# Patient Record
Sex: FEMALE | Race: WHITE | NOT HISPANIC OR LATINO | Employment: FULL TIME | URBAN - METROPOLITAN AREA
[De-identification: names, ages, dates, MRNs, and addresses within clinical notes are randomized per-mention and may not be internally consistent; named-entity substitution may affect disease eponyms.]

---

## 2017-09-12 ENCOUNTER — OFFICE VISIT - RIVER FALLS (OUTPATIENT)
Dept: FAMILY MEDICINE | Facility: CLINIC | Age: 47
End: 2017-09-12

## 2017-12-06 ENCOUNTER — OFFICE VISIT - RIVER FALLS (OUTPATIENT)
Dept: FAMILY MEDICINE | Facility: CLINIC | Age: 47
End: 2017-12-06

## 2017-12-06 ASSESSMENT — MIFFLIN-ST. JEOR: SCORE: 1596.58

## 2017-12-07 LAB
CREAT SERPL-MCNC: 0.69 MG/DL (ref 0.5–1.1)
GLUCOSE BLD-MCNC: 92 MG/DL (ref 65–99)

## 2018-06-01 ENCOUNTER — OFFICE VISIT - RIVER FALLS (OUTPATIENT)
Dept: FAMILY MEDICINE | Facility: CLINIC | Age: 48
End: 2018-06-01

## 2018-06-01 ASSESSMENT — MIFFLIN-ST. JEOR: SCORE: 1495.88

## 2018-10-19 ENCOUNTER — OFFICE VISIT - RIVER FALLS (OUTPATIENT)
Dept: FAMILY MEDICINE | Facility: CLINIC | Age: 48
End: 2018-10-19

## 2018-10-19 ASSESSMENT — MIFFLIN-ST. JEOR: SCORE: 1531.26

## 2020-01-03 ENCOUNTER — OFFICE VISIT - RIVER FALLS (OUTPATIENT)
Dept: FAMILY MEDICINE | Facility: CLINIC | Age: 50
End: 2020-01-03

## 2020-01-03 ASSESSMENT — MIFFLIN-ST. JEOR: SCORE: 1756.25

## 2020-06-29 ENCOUNTER — OFFICE VISIT - RIVER FALLS (OUTPATIENT)
Dept: FAMILY MEDICINE | Facility: CLINIC | Age: 50
End: 2020-06-29

## 2021-10-19 ENCOUNTER — OFFICE VISIT - RIVER FALLS (OUTPATIENT)
Dept: FAMILY MEDICINE | Facility: CLINIC | Age: 51
End: 2021-10-19

## 2021-10-19 ENCOUNTER — COMMUNICATION - RIVER FALLS (OUTPATIENT)
Dept: FAMILY MEDICINE | Facility: CLINIC | Age: 51
End: 2021-10-19

## 2021-10-19 ENCOUNTER — AMBULATORY - RIVER FALLS (OUTPATIENT)
Dept: FAMILY MEDICINE | Facility: CLINIC | Age: 51
End: 2021-10-19

## 2021-10-19 ENCOUNTER — LAB REQUISITION (OUTPATIENT)
Dept: LAB | Facility: CLINIC | Age: 51
End: 2021-10-19
Payer: COMMERCIAL

## 2021-10-19 PROCEDURE — U0005 INFEC AGEN DETEC AMPLI PROBE: HCPCS | Mod: ORL | Performed by: FAMILY MEDICINE

## 2021-10-20 LAB — SARS-COV-2 RNA RESP QL NAA+PROBE: NEGATIVE

## 2021-10-21 LAB — SARS-COV-2 RNA RESP QL NAA+PROBE: NEGATIVE

## 2022-02-12 VITALS
HEIGHT: 63 IN | HEART RATE: 68 BPM | DIASTOLIC BLOOD PRESSURE: 78 MMHG | SYSTOLIC BLOOD PRESSURE: 120 MMHG | BODY MASS INDEX: 36.61 KG/M2 | TEMPERATURE: 98.4 F | WEIGHT: 206.6 LBS

## 2022-02-12 VITALS
WEIGHT: 198.8 LBS | BODY MASS INDEX: 35.22 KG/M2 | HEART RATE: 70 BPM | TEMPERATURE: 97.8 F | DIASTOLIC BLOOD PRESSURE: 74 MMHG | SYSTOLIC BLOOD PRESSURE: 116 MMHG | HEIGHT: 63 IN

## 2022-02-12 VITALS
SYSTOLIC BLOOD PRESSURE: 118 MMHG | TEMPERATURE: 97.5 F | BODY MASS INDEX: 39.16 KG/M2 | HEIGHT: 63 IN | WEIGHT: 221 LBS | DIASTOLIC BLOOD PRESSURE: 80 MMHG | HEART RATE: 60 BPM

## 2022-02-12 VITALS
BODY MASS INDEX: 45.39 KG/M2 | TEMPERATURE: 98.3 F | WEIGHT: 256.2 LBS | SYSTOLIC BLOOD PRESSURE: 110 MMHG | DIASTOLIC BLOOD PRESSURE: 78 MMHG | HEART RATE: 64 BPM | HEIGHT: 63 IN

## 2022-02-12 VITALS
WEIGHT: 231.6 LBS | BODY MASS INDEX: 41.03 KG/M2 | HEART RATE: 93 BPM | SYSTOLIC BLOOD PRESSURE: 93 MMHG | TEMPERATURE: 98.9 F | DIASTOLIC BLOOD PRESSURE: 67 MMHG

## 2022-02-12 VITALS — BODY MASS INDEX: 36.6 KG/M2 | HEIGHT: 63 IN

## 2022-02-16 NOTE — NURSING NOTE
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 escitalopram refill also.   Menstrual Status :   Menarcheal   Weight Measured :   256.2 lb(Converted to: 256 lb 3 oz, 116.21 kg)    Height Measured :   63 in(Converted to: 5 ft 3 in, 160.02 cm)    Body Mass Index :   45.38 kg/m2 (HI)    Body Surface Area :   2.27 m2   Systolic Blood Pressure :   110 mmHg   Diastolic Blood Pressure :   78 mmHg   Mean Arterial Pressure :   89 mmHg   Peripheral Pulse Rate :   64 bpm   BP Site :   Right arm   Pulse Site :   Radial artery   BP Method :   Manual   HR Method :   Manual   Temperature Tympanic :   98.3 DegF(Converted to: 36.8 DegC)    Vanessa Amanda - 1/3/2020 2:51 PM CST   Health Status   Allergies Verified? :   Yes   Medication History Verified? :   Yes   Medical History Verified? :   Yes   Pre-Visit Planning Status :   Completed   Tobacco Use? :   Never smoker   Vanessa Amanda - 1/3/2020 2:51 PM CST   Consents   Consent for Immunization Exchange :   Consent Granted   Consent for Immunizations to Providers :   Consent Granted   Vanessa Amanda - 1/3/2020 2:51 PM CST   Meds / Allergies   (As Of: 1/3/2020 2:58:26 PM CST)   Allergies (Active)   No known allergies  Estimated Onset Date:   Unspecified ; Created By:   Generated Domain User for [id number]; Reaction Status:   Active ; Substance:   No known allergies ; Updated By:   Generated Domain User for [id number]; Reviewed Date:   10/19/2018 12:34 PM CDT      No Known Medication Allergies  Estimated Onset Date:   Unspecified ; Created By:   Vanessa Amanda; Reaction Status:   Active ; Category:   Drug ; Substance:   No Known Medication Allergies ;  Type:   Allergy ; Updated By:   Vanessa Amanda; Reviewed Date:   1/3/2020 2:57 PM CST        Medication List   (As Of: 1/3/2020 2:58:26 PM CST)   Prescription/Discharge Order    escitalopram  :   escitalopram ; Status:   Prescribed ; Ordered As Mnemonic:   escitalopram 20 mg oral tablet ; Simple Display Line:   1 tab(s), Oral, daily, SEE PROVIDER FOR FURTHER REFILLS., 14 tab(s), 0 Refill(s) ; Ordering Provider:   Dorothea Holguin; Catalog Code:   escitalopram ; Order Dt/Tm:   5/21/2019 9:22:51 AM CDT          fluticasone nasal  :   fluticasone nasal ; Status:   Prescribed ; Ordered As Mnemonic:   Flonase 50 mcg/inh nasal spray ; Simple Display Line:   2 spray(s), Nasal, Daily, 1 EA, 6 Refill(s) ; Ordering Provider:   Marcelino Singh MD; Catalog Code:   fluticasone nasal ; Order Dt/Tm:   9/12/2017 7:17:21 PM CDT

## 2022-02-16 NOTE — PROGRESS NOTES
Patient:   YOMAIRA HO            MRN: [id number]            FIN: [id number]               Age:   50 years     Sex:  Female     :  1970   Associated Diagnoses:   CARIDAD (generalized anxiety disorder); Obesity   Author:   Dorothea Holguin      Visit Information      Date of Service: 2020 07:44 am  Performing Location: [facility]  Encounter#: [id number]      Primary Care Provider (PCP):  YUNIOR BARBA    NPI# [id number]      Referring Provider:  Dorothea Holguin    NPI# [id number]   Visit type:  video.    Participants in room during visit:  pt_   Location of patient:  _home  Location of provider:  _ clinic  Video Start Time:  _8:36  Video End Time:   _8:48    Today's visit was conducted via video conference due to the COVID-19 pandemic.  The patient's consent to proceed with a video visit has been obtained and documented.      Chief Complaint   2020 8:30 AM CDT    depression/anxiety med check verbal consent given for video visit        History of Present Illness   Patient is a 50_ year old female_ who is being evaluated via a billable video visit.    feels like she has gained #60 since before Lexapro.  Forgot her pills and missed 10 days of pills and noticed she has a sex drive again. Doesn't want to be on 'nothing' because it helps with her temper and feel more stable. Uses it for Anxiety, asked about wellbutrin. Had used keto for wt loss then gained it all back      Health Status   Allergies:    Allergic Reactions (Selected)  Severity Not Documented  No known allergies (No reactions were documented)  No Known Medication Allergies   Medications:  (Selected)   Prescriptions  Prescribed  escitalopram 20 mg oral tablet: = 1 tab(s), Oral, daily, # 90 tab(s), 3 Refill(s), Type: Maintenance, Pharmacy: [facility] #[id number], 1 tab(s) Oral daily,    Medications          *denotes recorded medication          escitalopram 20 mg oral tablet: 1 tab(s), Oral, daily, 90 tab(s), 3  Refill(s).       Problem list:    All Problems  Family history of colon cancer in mother / SNOMED CT 511762748 / Confirmed  Age 65  CARIDAD (generalized anxiety disorder) / SNOMED CT 20785537 / Confirmed  Obesity / SNOMED CT 5143331964 / Probable  Persistent insomnia disorder with non-sleep disorder mental comorbidity / SNOMED CT 316143377 / Confirmed      Histories   Past Medical History:    No active or resolved past medical history items have been selected or recorded.   Family History:    No family history items have been selected or recorded.   Procedure history:    Colonoscopy (SNOMED CT 742892654) performed by Arvin Alvarado MD on 12/20/2018 at 48 Years.  Comments:  1/21/2020 11:23 AM CST - Lilibeth Forrest  Indication:  First-degree relative with colon polyps at age 60.  Sedation:  MAC.  Impression:  Diverticulosis in entire colon.   Recommendation:  Repeat in 5 years.  Tonsillectomy (SNOMED CT 540686320).  Cholecystectomy (SNOMED CT 86447762).  Breast reconstruction (SNOMED CT 37277432).   Social History:        Alcohol Assessment            Current, Wine (5 oz), 1-2 times per month, 3 drinks/episode average.  4 drinks/episode maximum.      Tobacco Assessment            Never (less than 100 in lifetime)      Employment and Education Assessment            Employed      Home and Environment Assessment            Marital status:  (Living together).        Physical Examination   believes she weight about #250 which is what she weight about at her last visit   General:  Alert and oriented, No acute distress.    Eye:  Normal conjunctiva.    Respiratory:  Respirations are non-labored.    Psychiatric:  Cooperative, Appropriate mood & affect, Normal judgment.       Impression and Plan   Diagnosis     CARIDAD (generalized anxiety disorder) (ZDX34-CV F41.1).     Obesity (QZF68-MO E66.9).     Plan:  counseled on wt loss plan, reduce carbs, maintain 1400 moe diet initially  will restart Lexapro at 10 mg daily, do not  advise switch to Wellbutrin for strictly anxiety at this time  she is in agreement, check wiht me in 3months.    Orders     Orders (Selected)   Prescriptions  Prescribed  Lexapro 10 mg oral tablet: = 1 tab(s) ( 10 mg ), Oral, daily, # 90 tab(s), 0 Refill(s), Type: Maintenance, Pharmacy: [facility] #[id number], reduced dose, 1 tab(s) Oral daily, 63, in, 06/29/20 8:30:00 CDT, Height Measured, 256.2, lb, 01/03/20 14:51:00 CST, Weight Measured.

## 2022-02-16 NOTE — PROGRESS NOTES
Patient:   YOMAIRA HO            MRN: [id number]            FIN: [id number]               Age:   47 years     Sex:  Female     :  1970   Associated Diagnoses:   Leg cramps   Author:   Dorothea Holguin      Visit Information      Date of Service: 2017 04:20 pm  Performing Location: [facility]  Encounter#: [id number]      Primary Care Provider (PCP):  YUNIOR BARBA      Referring Provider:  Richard Huddleston MD    NPI# [id number]      Chief Complaint   2017 4:29 PM CST    c/o recent anxiety and states she had it in the past. Also complains of shin pain x2 months, usually when shes driving-- she is wondering if it is related to being on a ketogenic-low carb diet?      History of Present Illness   confirmed chief complaint with patient  The cramps occur in the evening and night but primarily while driving. She has had to pull over and let her daughter drive because the cramps were so bad.  The pain is not in the calf but in the low anterior leg.  She is not otherwise ill in anyway.  No OTC products have been used  She is wondering about magnesium levels.           Review of Systems             Health Status   Allergies:    Allergic Reactions (Selected)  Severity Not Documented  No known allergies (No reactions were documented)   Medications:  (Selected)   Prescriptions  Prescribed  Flonase 50 mcg/inh nasal spray: 2 spray(s), Nasal, Daily, # 1 EA, 6 Refill(s), Type: Maintenance, Pharmacy: [id number], 2 spray(s) nasal daily  Lexapro 10 mg oral tablet: 1 tab(s) ( 10 mg ), PO, Daily, # 90 tab(s), 0 Refill(s), Type: Maintenance, Pharmacy: [id number], 1 tab(s) po daily   Problem list:    All Problems  Obesity / SNOMED CT 9074870726 / Probable  Resolved: Cholecystectomy  Resolved: Tonsillectomy  Resolved: Breast reconstruction      Histories   Past Medical History:    Resolved  Cholecystectomy:  Resolved.  Tonsillectomy:  Resolved.  Breast  reconstruction:  Resolved.   Family History:    No family history items have been selected or recorded.   Procedure history:    No active procedure history items have been selected or recorded.   Social History:             No active social history items have been recorded.      Physical Examination   Vital Signs   12/6/2017 4:29 PM CST Temperature Tympanic 97.5 DegF  LOW    Peripheral Pulse Rate 60 bpm    Pulse Site Radial artery    HR Method Manual    Systolic Blood Pressure 118 mmHg    Diastolic Blood Pressure 80 mmHg    Mean Arterial Pressure 93 mmHg    BP Site Right arm    BP Method Manual      Measurements from flowsheet : Measurements   12/6/2017 4:29 PM CST Height Measured - Standard 63 in    Weight Measured - Standard 221.0 lb    BSA 2.11 m2    Body Mass Index 39.14 kg/m2      General:  Alert and oriented, No acute distress.    Neck:  Supple.    Respiratory:  Lungs are clear to auscultation, Respirations are non-labored.    Cardiovascular:  Normal peripheral perfusion, no ankle edema,  no calf tenderness.       Impression and Plan   Diagnosis     Leg cramps (IER08-DV R25.2).     Patient Instructions:       Counseled: Patient, Regarding diagnosis, Regarding treatment, Regarding medications, Verbalized understanding, Counseled on symptomatic management. Return to clinic for re evaluation if worsening, simply not improving, or failure to resolve.   will trial magnesium sultfate 250mg daily, she has been using magnesium oxide 500mg daily and was told she might have the wrong type of magnesium by a friend and will make a switch.    Orders     Orders (Selected)   Outpatient Orders  Completed  Basic Metabolic Panel* ([facility]): Specimen Type: Serum, Collection Date: 12/06/17 17:01:00 CST.

## 2022-02-16 NOTE — PROGRESS NOTES
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 escitalopram refill also.        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 She took excess naproxen and ibuprofen about 12 hours ago, is using ice. has some shooting pain into her buttock on the left side but no numbness in foot  no hx of prior back injury requiring any surgery or treatment  she has gained wt and has not been exercising      Physical Examination   Vital Signs   1/3/2020 2:51 PM CST Temperature Tympanic 98.3 DegF    Peripheral Pulse Rate 64 bpm    Pulse Site Radial artery    HR Method Manual    Systolic Blood Pressure 110 mmHg    Diastolic Blood Pressure 78 mmHg    Mean Arterial Pressure 89 mmHg    BP Site Right arm    BP Method Manual      General:  Mild distress, difficulty standing  can lumbar flex 30 degrees, twist side to side is very painful  was unable to lie supine for straight leg lift, no bruising on back.       Impression and Plan   Diagnosis     Lumbar strain (NXF85-KO S39.012A).     Plan:  ice/rest /prednisone /muscle relaxor   tolerated toradol 60mg im well, monitored  gradual return to work  advised PT and  wt loss to prevent recurrent problem.    Orders     Orders (Selected)   Outpatient Orders  Ordered  Referral (Request): 01/03/20 15:18:00 CST, Referred to: Other, Referred to: set up PT for back pain, Lumbar strain  Prescriptions  Prescribed  cyclobenzaprine 10 mg oral tablet: = 1 tab(s) ( 10 mg ), Oral, tid, PRN: for spasm, # 30 tab(s), 0 Refill(s), Type: Maintenance, Pharmacy: [facility] #[id number], 1 tab(s) Oral tid,PRN:for spasm  predniSONE 10 mg oral tablet: = 3 tab(s) ( 30 mg ), Oral, daily, x 7 day(s), # 21 tab(s), 0 Refill(s), Type: Acute, Pharmacy: [facility] #[id number], 3 tab(s) Oral daily,x7 day(s).

## 2022-02-16 NOTE — TELEPHONE ENCOUNTER
Entered by Lizzie Aldridge CMA on April 15, 2019 11:06:00 AM CDT  PCP:   [facility]    Medication:   Escitalopram   Last Filled:  10/19/18   Quantity: 90 tab  Refills:  1    Date of last office visit and reason:   10/19/18 Multiple  Date of last labs pertaining to condition:  n/a    Note:  Please advise on refill. No RTC placed.     Return to Clinic order placed?  n/a    Resource:   eRx pool  Phone:   [phone]      ------------------------------------------  From: JEDI MIND [id number]  To: Dorothea Holguin  Sent: April 13, 2019 6:18:33 PM CDT  Subject: Medication Management  Due: April 14, 2019 6:18:33 PM CDT    ** On Hold Pending Signature **  Drug: escitalopram (escitalopram 20 mg oral tablet)  TAKE 1 TABLET BY MOUTH DAILY  Quantity: 90 tab(s)     Days Supply: 0         Refills: 0  Substitutions Allowed  Notes from Pharmacy:     Dispensed Drug: escitalopram (escitalopram 20 mg oral tablet)  TAKE 1 TABLET BY MOUTH DAILY  Quantity: 90 tab(s)     Days Supply: 90        Refills: 0  Substitutions Allowed  Notes from Pharmacy:   ---------------------------------------------------------------  From: Lizzie Aldridge CMA (eRx Pool ([address]))   To: [facility] Pool ([address]);     Sent: 4/15/2019 11:06:09 AM CDT  Subject: FW: Medication Management   Due Date/Time: 4/14/2019 6:18:00 PM CDT---------------------  From: Nena Hill LPN ([facility] Pool ([address]_[facility]))   To: Dorothea Holguin;     Sent: 4/15/2019 7:58:31 PM CDT  Subject: FW: Medication Management   Due Date/Time: 4/14/2019 6:18:00 PM CDT---------------------  From: Dorothea Holguin   To: [facility] Pool ([address]);     Sent: 4/15/2019 8:17:44 PM CDT  Subject: RE: Medication Management     please send 30 day refill and ask her to see me.---------------------  From: Vanessa Amanda   To: [facility] [id number]    Sent: 4/16/2019 8:19:07 AM CDT  Subject: RE: Medication Management     ** Submitted:  **  Order:escitalopram (escitalopram 20 mg oral tablet)  1 tab(s)  Oral  daily  see provider for further refills  Qty:  30 tab(s)        Refills:  0          Substitutions Allowed     Route To [facility] [id number]    Signed by Vanessa Amanda  4/16/2019 8:18:00 AM    ** Submitted: **  Complete:escitalopram (escitalopram 20 mg oral tablet)   Signed by Vanessa Amanda  4/16/2019 8:19:00 AM    ** Not Approved:  **  escitalopram (ESCITALOPRAM 20MG TABLETS)  TAKE 1 TABLET BY MOUTH DAILY  Qty:  90 tab(s)        Days Supply:  90        Refills:  0          Substitutions Allowed     Route To [facility] [id number]   Signed by Vanessa Amanda

## 2022-02-16 NOTE — PROGRESS NOTES
Patient:   YOMAIRA HO            MRN: [id number]            FIN: [id number]               Age:   48 years     Sex:  Female     :  1970   Associated Diagnoses:   CARIDAD (generalized anxiety disorder); Persistent insomnia disorder with non-sleep disorder mental comorbidity; Family history of colon cancer in mother   Author:   Yuri Pimentel MD      Visit Information   Visit type:  Scheduled follow-up.    Accompanied by:  No one.    Source of history:  Self.    History limitation:  None.       Chief Complaint   2018 4:32 PM CDT     F/u anxiety medication      History of Present Illness    The patient has generalized anxiety disorder here for refill of Lexapro.  She was on Lexapro for a number of years during her stressful first marriage and it worked well.  When she and her two girls lived along and she was working at home she found that her stress was better and she was off medication for an extended time.  She is now remarried and between she and her second  they have five teenage daughters at home and it is very stressful.  She is working at running a .  She has been on Lexapro for about six months at 10 mg daily and it has been helpful.  Her GAD7 score is at 14.  PHQ-9 score is 8.  She has chronic insomnia and has difficulty going to sleep and staying asleep.  Her  snores and moves and talks in his sleep sometimes.  We discussed sleep hygiene.  She does not snore, no restless legs or leg movements.  She does not fall asleep during the daytime.  We discussed cognitive and behavioral therapy for insomnia.     In reviewing her chart her mother had colon cancer at age 65 so colonoscopy for the patient is indicated starting at age 40.  Ultimately we decided she would follow up her provider, Dorothea Ellsworth RN, C-NP, in the near future.           Review of Systems   Constitutional:  No weakness, No fatigue, No decreased activity, No weight loss.    Respiratory:  No shortness of breath.     Cardiovascular:  No chest pain.    Gastrointestinal:  No nausea, No vomiting, No diarrhea.    Neurologic:  Negative.    Psychiatric:  Negative except as documented in history of present illness.       Health Status   Allergies:    Allergic Reactions (Selected)  Severity Not Documented  No known allergies (No reactions were documented)   Medications:  (Selected)   Prescriptions  Prescribed  Flonase 50 mcg/inh nasal spray: 2 spray(s), Nasal, Daily, # 1 EA, 6 Refill(s), Type: Maintenance, Pharmacy: [facility], 2 spray(s) nasal daily  escitalopram 20 mg oral tablet: 1 tab(s) ( 20 mg ), PO, Daily, # 30 tab(s), 1 Refill(s), Type: Maintenance, Pharmacy: [facility] [address], 1 tab(s) po daily   Problem list:    All Problems (Selected)  Obesity / SNOMED CT 4970880892 / Probable      Histories   Family History:    No family history items have been selected or recorded.   Procedure history:    Tonsillectomy (928364540).  Cholecystectomy (41237151).  Breast reconstruction (07176177).   Social History:        Alcohol Assessment            Current, Wine (5 oz), 1-2 times per month, 3 drinks/episode average.      Tobacco Assessment            Never (less than 100 in lifetime)      Employment and Education Assessment            Employed      Home and Environment Assessment            Marital status:  (Living together).        Physical Examination   Vital Signs   6/1/2018 4:32 PM CDT Temperature Tympanic 97.8 DegF  LOW    Peripheral Pulse Rate 70 bpm    Pulse Site Radial artery    HR Method Manual    Systolic Blood Pressure 116 mmHg    Diastolic Blood Pressure 74 mmHg    Mean Arterial Pressure 88 mmHg    BP Site Right arm    BP Method Manual      Measurements from flowsheet : Measurements   6/1/2018 4:32 PM CDT Height Measured - Standard 63 in    Weight Measured - Standard 198.8 lb    BSA 2 m2    Body Mass Index 35.21 kg/m2  HI      General:  Alert and oriented, No acute distress.    Eye:  Normal  conjunctiva.    HENT:  Normocephalic, Normal hearing.    Musculoskeletal:  Normal gait.    Integumentary:  No pallor.    Neurologic:  No focal deficits.    Psychiatric:  Cooperative, Appropriate mood & affect, Normal judgment, Non-suicidal.       Impression and Plan   Diagnosis     CARIDAD (generalized anxiety disorder) (MVO50-LY F41.1).     Persistent insomnia disorder with non-sleep disorder mental comorbidity (CDK52-FR G47.00).     Course:  Incomplete response to treatment.    Patient Instructions:       Counseled: Regarding medications, Diet, Activity, Verbalized understanding, sleep hygiene.    Orders     Orders (Selected)   Outpatient Orders  Ordered  Return to Clinic (Request): Return in 6-8 weeks  Prescriptions  Prescribed  escitalopram 20 mg oral tablet: 1 tab(s) ( 20 mg ), PO, Daily, # 30 tab(s), 1 Refill(s), Type: Maintenance, Pharmacy: [facility] [address], 1 tab(s) po daily.     Diagnosis     Family history of colon cancer in mother (PFO07-NH Z80.0).     Orders     Colonoscopy recommended.

## 2022-02-16 NOTE — PROGRESS NOTES
Patient:   YOMAIRA HO            MRN: [id number]            FIN: [id number]               Age:   48 years     Sex:  Female     :  1970   Associated Diagnoses:   Family history of colon cancer in mother; CARIDAD (generalized anxiety disorder); Varicose vein of leg   Author:   Dorothea Holguin      Chief Complaint   10/19/2018 12:30 PM CDT  Depression/anxiety med check/refill, also right shoulder pain since , denies any injury        History of Present Illness   confirmed chief complaint with patient  Here to catch up on a few things  1. needs order for screening colonoscopy. Mom onset age 65 of colon cancer  2.  would like to update all preventative care--will schedule mammo, tdap and flu shot today  3.  here for Lexapro recheck. going well at 20mg dose  4. woke up with knot behind right should this morning, thinks she slep on it funny, good ROM of arm  5. she has painful varicose veins on both legs,  has been this way for years, requests to see someone to have them fixed         Review of Systems             Health Status   Allergies:    Allergic Reactions (Selected)  Severity Not Documented  No known allergies (No reactions were documented)   Medications:  (Selected)   Prescriptions  Prescribed  Flonase 50 mcg/inh nasal spray: 2 spray(s), Nasal, Daily, # 1 EA, 6 Refill(s), Type: Maintenance, Pharmacy: [id number], 2 spray(s) nasal daily  escitalopram 20 mg oral tablet: See Instructions, Instructions: TAKE 1 TABLET BY MOUTH DAILY, # 90 tab(s), 1 Refill(s), Type: Soft Stop, Pharmacy: [facility] [id number], TAKE 1 TABLET BY MOUTH DAILY   Problem list:    All Problems  Obesity / SNOMED CT 1210581978 / Probable  CARIDAD (generalized anxiety disorder) / SNOMED CT 62069257 / Confirmed  Family history of colon cancer in mother / SNOMED CT 533623877 / Confirmed  Age 65  Persistent insomnia disorder with non-sleep disorder mental comorbidity / SNOMED CT 351191832 / Confirmed  Canceled:  Cholecystectomy  Canceled: Tonsillectomy  Canceled: Breast reconstruction      Histories   Past Medical History:    No active or resolved past medical history items have been selected or recorded.   Family History:    No family history items have been selected or recorded.   Procedure history:    Tonsillectomy (SNOMED CT 919406525).  Cholecystectomy (SNOMED CT 18851437).  Breast reconstruction (SNOMED CT 05988102).   Social History:        Alcohol Assessment            Current, Wine (5 oz), 1-2 times per month, 3 drinks/episode average.  4 drinks/episode maximum.      Tobacco Assessment            Never (less than 100 in lifetime)      Employment and Education Assessment            Employed      Home and Environment Assessment            Marital status:  (Living together).        Physical Examination   Vital Signs   10/19/2018 12:30 PM CDT Temperature Tympanic 98.4 DegF    Peripheral Pulse Rate 68 bpm    Pulse Site Radial artery    HR Method Manual    Systolic Blood Pressure 120 mmHg    Diastolic Blood Pressure 78 mmHg    Mean Arterial Pressure 92 mmHg    BP Site Right arm    BP Method Manual      Measurements from flowsheet : Measurements   10/19/2018 12:30 PM CDT Height Measured - Standard 63 in    Weight Measured - Standard 206.6 lb    BSA 2.04 m2    Body Mass Index 36.59 kg/m2  HI      General:  Alert and oriented, No acute distress.    Neck:  Supple, Non-tender, No lymphadenopathy.    Respiratory:  Lungs are clear to auscultation, Respirations are non-labored, Breath sounds are equal.    Cardiovascular:  Normal rate, Regular rhythm, No murmur, has some spider veins at right and left knee and describes rope like veins under her jeans higher on the thighs.    Musculoskeletal:  Normal range of motion, Normal gait.    Integumentary:  Warm, Dry, Pink.       Impression and Plan   Diagnosis     Family history of colon cancer in mother (KTX01-CP Z80.0).     CARIDAD (generalized anxiety disorder) (HVZ83-HB F41.1).      Varicose vein of leg (GFT35-TD I83.90).     Patient Instructions:       Counseled: Patient, Regarding diagnosis, Regarding treatment, Regarding medications, Verbalized understanding, will set up with DR Hernandez for veins  refilled lexapro  vaccine update  orders written for colonoscopy given FH of colon cancer  will rtc for labs, she is following a ketogenic diet and wants to know impact on lipids/A1c  schedule annual exam with pap as she is due.    Orders     Orders (Selected)   Outpatient Orders  Ordered  Referral (Request): 10/19/18 13:01:00 CDT, Referred to: Cardiology, Referred to: Dr Tracie Hernandez, Varicose vein of leg  Prescriptions  Prescribed  escitalopram 20 mg oral tablet: See Instructions, Instructions: TAKE 1 TABLET BY MOUTH DAILY, # 90 tab(s), 1 Refill(s), Type: Soft Stop, Pharmacy: [facility] [id number], TAKE 1 TABLET BY MOUTH DAILY.

## 2022-02-16 NOTE — TELEPHONE ENCOUNTER
---------------------  From: Bryanna Green CMA   Sent: 10/19/2021 4:10:24 PM CDT  [facility]     [facility] was seen at [facility] today for covid testing per Dr. Singh. 02 Sat not taken. Pt resides in [facility]-will notify Public Health if positive.

## 2022-02-16 NOTE — NURSING NOTE
Comprehensive Intake Entered On:  6/29/2020 8:35 AM CDT    Performed On:  6/29/2020 8:30 AM CDT by Danielle Guzman CMA               Summary   Chief Complaint :   depression/anxiety med check verbal consent given for video visit    Menstrual Status :   Menarcheal   Height Measured :   63 in(Converted to: 5 ft 3 in, 160.02 cm)    Thomas NEWMAN Danielle - 6/29/2020 8:30 AM CDT   Health Status   Allergies Verified? :   Yes   Medication History Verified? :   Yes   Medical History Verified? :   No   Pre-Visit Planning Status :   Not completed   Tobacco Use? :   Never smoker   Thomas NEWMAN Danielle - 6/29/2020 8:30 AM CDT   Consents   Consent for Immunization Exchange :   Consent Granted   Consent for Immunizations to Providers :   Consent Granted   Danielle Guzman CMA - 6/29/2020 8:30 AM CDT   Meds / Allergies   (As Of: 6/29/2020 8:35:12 AM CDT)   Allergies (Active)   No known allergies  Estimated Onset Date:   Unspecified ; Created By:   Generated Domain User for [id number]; Reaction Status:   Active ; Substance:   No known allergies ; Updated By:   MIKA Audio Domain User for [id number]; Reviewed Date:   6/29/2020 8:34 AM CDT      No Known Medication Allergies  Estimated Onset Date:   Unspecified ; Created By:   Vanessa Amanda; Reaction Status:   Active ; Category:   Drug ; Substance:   No Known Medication Allergies ; Type:   Allergy ; Updated By:   Vanessa Amanda; Reviewed Date:   6/29/2020 8:34 AM CDT        Medication List   (As Of: 6/29/2020 8:35:12 AM CDT)   Prescription/Discharge Order    cyclobenzaprine  :   cyclobenzaprine ; Status:   Processing ; Ordered As Mnemonic:   cyclobenzaprine 10 mg oral tablet ; Ordering Provider:   Dorothea Holguin; Action Display:   Complete ; Catalog Code:   cyclobenzaprine ; Order Dt/Tm:   6/29/2020 8:33:26 AM CDT          escitalopram  :   escitalopram ; Status:   Prescribed ; Ordered As Mnemonic:   escitalopram 20 mg oral tablet ; Simple Display Line:   1 tab(s), Oral, daily, 90 tab(s), 3  Refill(s) ; Ordering Provider:   Dorothea Holguin; Catalog Code:   escitalopram ; Order Dt/Tm:   1/3/2020 3:19:50 PM CST          fluticasone nasal  :   fluticasone nasal ; Status:   Processing ; Ordered As Mnemonic:   Flonase 50 mcg/inh nasal spray ; Ordering Provider:   Marcelino Singh MD; Action Display:   Complete ; Catalog Code:   fluticasone nasal ; Order Dt/Tm:   6/29/2020 8:33:41 AM CDT            ID Risk Screen   Recent Travel History :   No recent travel   Family Member Travel History :   No recent travel   Other Exposure to Infectious Disease :   Unknown   Danielle Guzman CMA - 6/29/2020 8:30 AM CDT

## 2022-02-16 NOTE — TELEPHONE ENCOUNTER
---------------------  From: Angus JACINTO, Wendy HURT   To: Mónica Salgado;     Sent: 10/20/2021 5:32:59 PM CDT  Subject: Negative COVID     Pt informed by phone of negative COVID swab from 10/19/21. She will call back tomorrow with phone number to fax result to for her work.Pt called back and is requesting a note for work be printed and left at the  for her to  this afternoon.  Will print and leave with  staff.

## 2022-02-16 NOTE — NURSING NOTE
Depression Screening Entered On:  1/6/2020 8:17 AM CST    Performed On:  1/3/2020 8:16 AM CST by Ayleen Dunn               Depression Screening   Little Interest - Pleasure in Activities :   Not at all   Feeling Down, Depressed, Hopeless :   Not at all   Initial Depression Screen Score :   0    Trouble Falling or Staying Asleep :   More than half the days   Feeling Tired or Little Energy :   Several days   Poor Appetite or Overeating :   Several days   Feeling Bad About Yourself :   Not at all   Trouble Concentrating :   Not at all   Moving or Speaking Slowly :   Not at all   Thoughts Better Off Dead or Hurting Self :   Not at all   Detailed Depression Screen Score :   4    Total Depression Screen Score :   4    CARIDAD Difficulty with Work, Home, Others :   Somewhat difficult   Ayleen Dunn - 1/6/2020 8:16 AM CST

## 2022-02-16 NOTE — TELEPHONE ENCOUNTER
"---------------------  From: Ethel Fontaine CMA ([email or web address] Message Pool ([id number]_[facility]))   To: Marcelino Singh MD;     Sent: 10/19/2021 1:27:47 PM CDT  Subject: testing FYSPARKLE     Pt informed scheduling that she plans to be tested at her \"home\" clinic since they have 24 hour turn around time.  "

## 2022-02-16 NOTE — TELEPHONE ENCOUNTER
---------------------  From: Angus JACINTO, Wendy HURT (Phone Messages Pool ([id number]_[facility]))   To: Marcelino Singh MD;     Sent: 10/20/2021 5:40:09 PM CDT  Subject: Teeth hurt, swollen upper pallet     Phone message    PCP:   Saw [facility]      Time of Call:  1735       Person Calling:  Pt  Phone number:  [phone]    Returned call at: _    Note:   Pt has negative COVID swab from 10/19/21  States her teeth have been hurting today and like the roof of her mouth is swollen. She would like an Abx to treat it thinking it is a sinus infections.    Spoke to [facility]. Will send in Rx for Amoxicillin 875 mg po bid x10 days to [facility] pharmacy.---------------------  From: Marcelino Singh MD   To: Phone Messages [employer] ([id number]_[place] [facility] [place]);     Sent: 10/20/2021 5:40:32 PM CDT  Subject: RE: Teeth hurt, swollen upper pallet     okPt was informed of Rx. That it may take a few days to feel better. To come in if not better after done with Rx. She expressed understanding.

## 2022-02-16 NOTE — NURSING NOTE
Generalized Anxiety Disorder Screening Entered On:  1/6/2020 8:17 AM CST    Performed On:  1/3/2020 8:16 AM CST by Ayleen Dunn               Generalized Anxiety Disorder Screening   CARIDAD Nervous, Anxious On Edge :   More than half the days   CARIDAD Control Worrying B :   More than half the days   CARIDAD Worrying Too Much :   More than half the days   CARIDAD Restless :   Several days   CARIDAD Easily Annoyed/Irritable :   Nearly every day   CARIDAD Afraid :   More than half the days   CARIDAD Trouble Relaxing :   Several days   CARIDAD Total Screening Score :   13    CARIDAD Difficulty with Work, Home, Others :   Very difficult   Ayleen Dunn - 1/6/2020 8:16 AM CST

## 2022-02-16 NOTE — NURSING NOTE
Comprehensive Intake Entered On:  10/19/2021 11:32 AM CDT    Performed On:  10/19/2021 11:26 AM CDT by Ethel Fontaine CMA               Summary   Chief Complaint :   Verbal consent given for video visit. Cough, ST, HA, chills x 6 days. Positive COVID last year - sense of smell still has not come back.    Menstrual Status :   Menarcheal   Ethel Fontaine CMA - 10/19/2021 11:26 AM CDT   Health Status   Allergies Verified? :   Yes   Medication History Verified? :   Yes   Pre-Visit Planning Status :   Not completed   Ethel Fontaine CMA - 10/19/2021 11:26 AM CDT   Meds / Allergies   (As Of: 10/19/2021 11:32:04 AM CDT)   Allergies (Active)   No known allergies  Estimated Onset Date:   Unspecified ; Created By:   Generated Domain User for [id number]; Reaction Status:   Active ; Substance:   No known allergies ; Updated By:   Generated Domain User for [id number]; Reviewed Date:   6/29/2020 8:34 AM CDT      No Known Medication Allergies  Estimated Onset Date:   Unspecified ; Created By:   Vanessa Amanda; Reaction Status:   Active ; Category:   Drug ; Substance:   No Known Medication Allergies ; Type:   Allergy ; Updated By:   Vanessa Amanda; Reviewed Date:   6/29/2020 8:34 AM CDT        Medication List   (As Of: 10/19/2021 11:32:04 AM CDT)   Prescription/Discharge Order    escitalopram  :   escitalopram ; Status:   Prescribed ; Ordered As Mnemonic:   Lexapro 10 mg oral tablet ; Simple Display Line:   10 mg, 1 tab(s), Oral, daily, 90 tab(s), 0 Refill(s) ; Ordering Provider:   Dorothea Holguin; Catalog Code:   escitalopram ; Order Dt/Tm:   6/29/2020 8:52:11 AM CDT            Social History   Social History   (As Of: 10/19/2021 11:32:04 AM CDT)   Alcohol:        Current, Wine (5 oz), 1-2 times per month, 3 drinks/episode average.  4 drinks/episode maximum.   (Last Updated: 6/5/2018 7:37:56 AM CDT by Saida Odom)          Tobacco:        Never (less than 100 in lifetime)   (Last Updated: 6/1/2018 4:53:44 PM CDT by Margarito HERNANDEZ,  Yuri)   Never (less than 100 in lifetime)   (Last Updated: 10/19/2021 11:30:31 AM CDT by Ethel Fontaine CMA)          Electronic Cigarette/Vaping:        Electronic Cigarette Use: Never.   (Last Updated: 10/19/2021 11:30:38 AM CDT by Ethel Fontaine CMA)          Employment/School:        Employed   (Last Updated: 6/1/2018 4:53:44 PM CDT by Yuri Pimentel MD)          Home/Environment:        Marital status:  (Living together).   (Last Updated: 6/1/2018 4:53:44 PM CDT by Yuri Pimentel MD)

## 2022-02-16 NOTE — PROGRESS NOTES
Patient:   YOMAIRA HO            MRN: [id number]            FIN: [id number]               Age:   51 years     Sex:  Female     :  1970   Associated Diagnoses:   Acute viral syndrome   Author:   Marcelino Singh MD      Visit Information      Date of Service: 10/19/2021 06:37 am  Performing Location: [facility]  Encounter#: [id number]      Primary Care Provider (PCP):  YUNIOR BARBA    NPI# [id number]      Referring Provider:  Marcelino Singh MD    NPI# [id number]   Visit type:  Video Visit via [facility] or [employer].    Participants in room during visit:  _ pt   Location of patient:  _ home  Location of provider:  _ (Clinic office   Video Start Time:  _ 1135  Video End Time:   _1140    Today's visit was conducted via video conference due to the COVID-19 pandemic.  The patient's consent to proceed with a video visit has been obtained and documented.      Chief Complaint   10/19/2021 11:26 AM CDT  Verbal consent given for video visit. Cough, ST, HA, chills x 6 days. Positive COVID last year - sense of smell still has not come back.        History of Present Illness   Patient is being evaluated via a billable video visit. Patient calls for video visit because of cold.  She notes that 6daysshe started having nasal congestion and cough.  No fevers chills or sweats.  She does going out of  as frequently.  She had Covid a year ago and lost her sense of taste and smell since.  No chest pain no shortness of breath no rashes         Review of Systems   Constitutional:  Negative.    Eye:  Negative.    Ear/Nose/Mouth/Throat:  Negative except as documented in history of present illness.    Respiratory:  Negative except as documented in history of present illness.    Cardiovascular:  Negative.    Gastrointestinal:  Negative.    Genitourinary:  Negative.    Immunologic:  Negative.    Musculoskeletal:  Negative.    Integumentary:  Negative.    Neurologic:  Negative.    Psychiatric:   Negative.       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   Family History:    No family history items have been selected or recorded.   Procedure history:    Colonoscopy (SNOMED CT 767018348) performed by Jake Alvarado MDret on 12/20/2018 at 48 Years.  Comments:  1/21/2020 11:23 AM CST - Lilibeth Forrest  Indication:  First-degree relative with colon polyps at age 60.  Sedation:  MAC.  Impression:  Diverticulosis in entire colon.   Recommendation:  Repeat in 5 years.  Tonsillectomy (SNOMED CT 735146880).  Cholecystectomy (SNOMED CT 45772463).  Breast reconstruction (SNOMED CT 07919795).   Social History:        Electronic Cigarette/Vaping Assessment            Electronic Cigarette Use: Never.      Alcohol Assessment            Current, Wine (5 oz), 1-2 times per month, 3 drinks/episode average.  4 drinks/episode maximum.      Tobacco Assessment            Never (less than 100 in lifetime)            Never (less than 100 in lifetime)      Employment and Education Assessment            Employed      Home and Environment  Assessment            Marital status:  (Living together).        Physical Examination   General:  Alert and oriented, No acute distress.    Eye:  Pupils are equal, round and reactive to light, Normal conjunctiva.    HENT:  Oral mucosa is moist.    Neck:  Supple.    Respiratory:  Respirations are non-labored.    Psychiatric:  Cooperative, Appropriate mood & affect, Normal judgment.       Impression and Plan   Diagnosis     Acute viral syndrome (MVO18-AV B34.9).     Plan:  Patient with acute viral syndrome will check her for Covid.  Follow-up in a week if not better sooner if worse start December use it a year into no taste and smell now lightheadedness  .

## 2022-02-16 NOTE — PROGRESS NOTES
Patient:   YOMAIRA HO            MRN: [id number]            FIN: [id number]               Age:   47 years     Sex:  Female     :  1970   Associated Diagnoses:   Cough; Boil   Author:   Marcelino Singh MD      Visit Information      Date of Service: 2017 06:40 pm  Performing Location: [facility]  Encounter#: [id number]      Primary Care Provider (PCP):  YUNIOR BARBA      Referring Provider:  Marcelino Singh MD    NPI# [id number]      Chief Complaint   2017 7:05 PM CDT    Cough, sinus pressure.  Headache.  x3-4 weeks. Lump in underarm area, left side.        History of Present Illness   chief complaint and symptoms as noted above confirmed with patient   Patient has been sick for the past 3-4 weeks with congestion and cough she is bringing up some scant yellow sputum at times.  No fevers chills or sweats.  A daughter has mono.  She has had a lot of congestion has a remote history of seasonal allergies with asthma.  In addition to this she gets recurrent boils under her arms for the last year         Review of Systems   Constitutional:  Negative except as documented in history of present illness.    Ear/Nose/Mouth/Throat:  Negative except as documented in history of present illness.    Respiratory:  Negative except as documented in history of present illness.    Cardiovascular:  Negative.    Musculoskeletal:  Negative.    Integumentary:  Negative except as documented in history of present illness.    Neurologic:  Negative.             Health Status   Allergies:    Allergic Reactions (Selected)  Severity Not Documented  No known allergies (No reactions were documented)   Medications:  (Selected)   Prescriptions  Prescribed  Azithromycin 5 Day Dose Pack 250 mg oral tablet: 2 tabs today then 1 a day for 4 days, PO, qAM, x 5 day(s), Instructions: as directed on package labeling, # 6 tab(s), 0 Refill(s), Type: Acute, Pharmacy: [facility] [id number], 2 tabs today then 1 a  day for 4 days po qam,x5 day(s),Instr:as direct...  Flonase 50 mcg/inh nasal spray: 2 spray(s), Nasal, Daily, # 1 EA, 6 Refill(s), Type: Maintenance, Pharmacy: [facility] [id number], 2 spray(s) nasal daily  Septra  mg-160 mg oral tablet: 1 tab(s), PO, BID, # 20 tab(s), 0 Refill(s), Type: Maintenance, Pharmacy: [id number], 1 tab(s) po bid,x10 day(s)   Problem list:    All Problems (Selected)  Obesity / SNOMED CT 2537054018 / Probable      Histories   Past Medical History:    Resolved  Cholecystectomy:  Resolved.  Tonsillectomy:  Resolved.  Breast reconstruction:  Resolved.   Family History:    No family history items have been selected or recorded.   Procedure history:    No active procedure history items have been selected or recorded.   Social History:             No active social history items have been recorded.      Physical Examination   Vital Signs   9/12/2017 7:05 PM CDT Temperature Tympanic 98.9 DegF    Peripheral Pulse Rate 93 bpm    HR Method Electronic    Systolic Blood Pressure 93 mmHg    Diastolic Blood Pressure 67 mmHg    Mean Arterial Pressure 76 mmHg    BP Method Electronic      Measurements from flowsheet : Measurements   9/12/2017 7:05 PM CDT    Weight Measured - Standard                231.6 lb     General:  Alert and oriented, No acute distress.    HENT:  Tympanic membranes are clear, No pharyngeal erythema.    Neck:  Supple, Non-tender, No lymphadenopathy.    Respiratory:  Lungs are clear to auscultation, Respirations are non-labored.    Cardiovascular:  Normal rate, Regular rhythm.    Integumentary:  He has a resolving cyst under her right axilla  .    Neurologic:  Alert.       Impression and Plan   Diagnosis     Cough (UMO78-WT R05).     Boil (PZW79-UN L02.92).     Course:  Not progressing as expected.    Plan:  Productive cough for month with congestion will treat with Zithromax if she is not better consider chest x-ray and trial of inhalers.  Discussed skin care  with Hibiclens and a trial of Bactrim for 10 days.  For her boil.  .    Patient Instructions:       Counseled: Patient, Regarding diagnosis, Regarding treatment, Regarding medications.

## 2022-02-16 NOTE — LETTER
(Inserted Image. Unable to display)       May 17, 2019      YOMAIAR HO  [id number], [place] [id number]          Dear YOMAIRA,      Thank you for selecting [facility] for your healthcare needs.     Our records indicate you are due for the following services:     Medication Check    To schedule an appointment or if you have further questions, please contact your primary clinic:   [facility]       [phone]   [facility]       [phone]              [facility]     [phone]    Powered by [employer]    Sincerely,    VIRI Barajas

## 2022-02-16 NOTE — TELEPHONE ENCOUNTER
Entered by Elizabeth Amin RN on May 21, 2019 9:23:07 AM CDT  ---------------------  From: Elizabeth Amin RN   To: [email or web address] [id number]    Sent: 5/21/2019 9:23:07 AM CDT  Subject: Medication Management     ** Submitted: **  Order:escitalopram (escitalopram 20 mg oral tablet)  1 tab(s)  Oral  daily  SEE PROVIDER FOR FURTHER REFILLS.  Qty:  14 tab(s)        Refills:  0          Substitutions Allowed     Route To [facility] [email or web address] [facility]    Signed by Elizabeth Amin RN  5/21/2019 9:22:00 AM    ** Submitted: **  Complete:escitalopram (escitalopram 20 mg oral tablet)   Signed by Elizabeth Amin RN  5/21/2019 9:23:00 AM    ** Not Approved:  **  escitalopram (ESCITALOPRAM 20MG TABLETS)  TAKE 1 TABLET BY MOUTH DAILY SEE PROVIDER FOR FURTHER REFILLS  Qty:  30 tab(s)        Days Supply:  30        Refills:  0          Substitutions Allowed     Route To [facility] [employer] [address]   Signed by Elizabeth Amin RN            ------------------------------------------  From: [email or web address] [id number]  To: Dorothea Holguin  Sent: May 17, 2019 12:22:59 PM CDT  Subject: Medication Management  Due: May 18, 2019 12:22:59 PM CDT    ** On Hold Pending Signature **  Drug: escitalopram (escitalopram 20 mg oral tablet)  1 TAB(S) ORAL DAILY,INSTR:SEE PROVIDER FOR FURTHER REFILLS  Quantity: 30 tab(s)     Days Supply: 0         Refills: 0  Substitutions Allowed  Notes from Pharmacy:     Dispensed Drug: escitalopram (escitalopram 20 mg oral tablet)  TAKE 1 TABLET BY MOUTH DAILY SEE PROVIDER FOR FURTHER REFILLS  Quantity: 30 tab(s)     Days Supply: 30        Refills: 0  Substitutions Allowed  Notes from Pharmacy:   ------------------------------------------Date of last office visit and reason:  10-19-18 Multiple w/NCB      Date of last Med Check / Px:   10-19-18  Date of last labs pertaining to med:  _    RTC order in chart:  yes, due now for med check w/NCB    For Protocol refill, has patient been contacted:  reminder letter sent  5-17-19 for med check. Left message for patient informing her that two week supply was sent and appointment would need to be made for further refills.

## 2023-05-19 ENCOUNTER — OFFICE VISIT (OUTPATIENT)
Dept: FAMILY MEDICINE | Facility: CLINIC | Age: 53
End: 2023-05-19
Payer: COMMERCIAL

## 2023-05-19 VITALS
WEIGHT: 282 LBS | TEMPERATURE: 97.9 F | OXYGEN SATURATION: 96 % | HEART RATE: 100 BPM | BODY MASS INDEX: 49.95 KG/M2 | DIASTOLIC BLOOD PRESSURE: 84 MMHG | RESPIRATION RATE: 20 BRPM | SYSTOLIC BLOOD PRESSURE: 134 MMHG

## 2023-05-19 DIAGNOSIS — F41.9 ANXIETY AND DEPRESSION: ICD-10-CM

## 2023-05-19 DIAGNOSIS — L72.3 SEBACEOUS CYST: Primary | ICD-10-CM

## 2023-05-19 DIAGNOSIS — F32.A ANXIETY AND DEPRESSION: ICD-10-CM

## 2023-05-19 PROBLEM — G47.00 INSOMNIA: Status: ACTIVE | Noted: 2023-05-19

## 2023-05-19 PROBLEM — F41.1 GENERALIZED ANXIETY DISORDER: Status: ACTIVE | Noted: 2023-05-19

## 2023-05-19 PROBLEM — E66.9 OBESITY: Status: ACTIVE | Noted: 2023-05-19

## 2023-05-19 PROCEDURE — 99213 OFFICE O/P EST LOW 20 MIN: CPT | Performed by: FAMILY MEDICINE

## 2023-05-19 PROCEDURE — 96127 BRIEF EMOTIONAL/BEHAV ASSMT: CPT | Performed by: FAMILY MEDICINE

## 2023-05-19 RX ORDER — SULFAMETHOXAZOLE/TRIMETHOPRIM 800-160 MG
1 TABLET ORAL 2 TIMES DAILY
Qty: 20 TABLET | Refills: 0 | Status: SHIPPED | OUTPATIENT
Start: 2023-05-19

## 2023-05-19 RX ORDER — ESCITALOPRAM OXALATE 10 MG/1
10 TABLET ORAL DAILY
Qty: 30 TABLET | Refills: 1 | Status: SHIPPED | OUTPATIENT
Start: 2023-05-19 | End: 2023-08-10

## 2023-05-19 ASSESSMENT — PATIENT HEALTH QUESTIONNAIRE - PHQ9
5. POOR APPETITE OR OVEREATING: MORE THAN HALF THE DAYS
SUM OF ALL RESPONSES TO PHQ QUESTIONS 1-9: 6

## 2023-05-19 ASSESSMENT — ANXIETY QUESTIONNAIRES
IF YOU CHECKED OFF ANY PROBLEMS ON THIS QUESTIONNAIRE, HOW DIFFICULT HAVE THESE PROBLEMS MADE IT FOR YOU TO DO YOUR WORK, TAKE CARE OF THINGS AT HOME, OR GET ALONG WITH OTHER PEOPLE: VERY DIFFICULT
GAD7 TOTAL SCORE: 12
2. NOT BEING ABLE TO STOP OR CONTROL WORRYING: NEARLY EVERY DAY
6. BECOMING EASILY ANNOYED OR IRRITABLE: MORE THAN HALF THE DAYS
3. WORRYING TOO MUCH ABOUT DIFFERENT THINGS: SEVERAL DAYS
5. BEING SO RESTLESS THAT IT IS HARD TO SIT STILL: SEVERAL DAYS
1. FEELING NERVOUS, ANXIOUS, OR ON EDGE: SEVERAL DAYS
7. FEELING AFRAID AS IF SOMETHING AWFUL MIGHT HAPPEN: MORE THAN HALF THE DAYS
GAD7 TOTAL SCORE: 12

## 2023-05-19 ASSESSMENT — ENCOUNTER SYMPTOMS: WOUND: 1

## 2023-05-19 NOTE — PROGRESS NOTES
Assessment & Plan     Sebaceous cyst  Not able to open yet  - sulfamethoxazole-trimethoprim (BACTRIM DS) 800-160 MG tablet; Take 1 tablet by mouth 2 times daily    Anxiety and depression  Returned   - escitalopram (LEXAPRO) 10 MG tablet; Take 1 tablet (10 mg) by mouth daily      F/U Tuesday     MD NOAM Scott [facility] [place]    Lou Mascorro is a 53 year old, presenting for the following health issues:  Derm Problem (Abnormal sore on the back. Was red, raised and popped. Now returning. Hot and hard)        Roomed by: SRud              History of Present Illness       Reason for visit:  Bug bite? seems infected  Symptom onset:  3-4 weeks ago  Symptoms include:  Sore on my back that may be infected  Symptom intensity:  Moderate  Symptom progression:  Staying the same  Had these symptoms before:  Yes  Has tried/received treatment for these symptoms:  No  What makes it worse:  No  What makes it better:  No    She eats 2-3 servings of fruits and vegetables daily.She consumes 0 sweetened beverage(s) daily.She exercises with enough effort to increase her heart rate 9 or less minutes per day.  She exercises with enough effort to increase her heart rate 3 or less days per week.   She is taking medications regularly.       Anxiety Follow-Up    How are you doing with your anxiety since your last visit? Worsened     Are you having other symptoms that might be associated with anxiety? Yes:  see [facility]    Have you had a significant life event? Grief or Loss     Are you feeling depressed? Yes:   of roomates    Do you have any concerns with your use of alcohol or other drugs? No    Social History     Tobacco Use     Smoking status: Never     Smokeless tobacco: Never   Vaping Use     Vaping status: Never Used         2023     2:30 PM   CARIDAD-7 SCORE   Total Score 12         2023     2:30 PM   PHQ   PHQ-9 Total Score 6   Q9: Thoughts of better off dead/self-harm past 2 weeks Not  at all         5/19/2023     2:30 PM   Last PHQ-9   1.  Little interest or pleasure in doing things 0   2.  Feeling down, depressed, or hopeless 0   3.  Trouble falling or staying asleep, or sleeping too much 3   4.  Feeling tired or having little energy 1   5.  Poor appetite or overeating 1   6.  Feeling bad about yourself 1   7.  Trouble concentrating 0   8.  Moving slowly or restless 0   Q9: Thoughts of better off dead/self-harm past 2 weeks 0   PHQ-9 Total Score 6   Difficulty at work, home, or with people Somewhat difficult         5/19/2023     2:30 PM   CARIDAD-7    1. Feeling nervous, anxious, or on edge 1   2. Not being able to stop or control worrying 3   3. Worrying too much about different things 1   4. Trouble relaxing 2   5. Being so restless that it is hard to sit still 1   6. Becoming easily annoyed or irritable 2   7. Feeling afraid, as if something awful might happen 2   CARIDAD-7 Total Score 12   If you checked any problems, how difficult have they made it for you to do your work, take care of things at home, or get along with other people? Very difficult       Skin Lesion  Onset/Duration: 2 weeks  Description  Location: back  Color: red  Border description: sharp border  Character: round  Itching: no  Bleeding:  No  Intensity:  moderate  Progression of Symptoms:  worsening  Accompanying signs and symptoms:   Bleeding: No  Scaling: No  Excessive sun exposure/tanning: No  Sunscreen used: No        Review of Systems   Skin: Positive for wound.      Constitutional, HEENT, cardiovascular, pulmonary, gi and gu systems are negative, except as otherwise noted.      Objective    /84 (BP Location: Right arm, Patient Position: Sitting)   Pulse 100   Temp 97.9  F (36.6  C) (Tympanic)   Resp 20   Wt 127.9 kg (282 lb)   LMP  (LMP Unknown)   SpO2 96%   BMI 49.95 kg/m    Body mass index is 49.95 kg/m .  Physical Exam   GENERAL: healthy, alert and no distress  NECK: no adenopathy, no asymmetry, masses, or  scars and thyroid normal to palpation  RESP: lungs clear to auscultation - no rales, rhonchi or wheezes  CV: regular rate and rhythm, normal S1 S2, no S3 or S4, no murmur, click or rub, no peripheral edema and peripheral pulses strong  ABDOMEN: soft, nontender, no hepatosplenomegaly, no masses and bowel sounds normal  SKIN: Sebaceous Cyst to back, not lancable  PSYCH: mentation appears normal, affect normal/bright

## 2023-05-23 ENCOUNTER — OFFICE VISIT (OUTPATIENT)
Dept: FAMILY MEDICINE | Facility: CLINIC | Age: 53
End: 2023-05-23
Payer: COMMERCIAL

## 2023-05-23 VITALS
RESPIRATION RATE: 16 BRPM | DIASTOLIC BLOOD PRESSURE: 67 MMHG | TEMPERATURE: 98.6 F | SYSTOLIC BLOOD PRESSURE: 117 MMHG | HEART RATE: 94 BPM

## 2023-05-23 DIAGNOSIS — L72.3 SEBACEOUS CYST: Primary | ICD-10-CM

## 2023-05-23 PROCEDURE — 10060 I&D ABSCESS SIMPLE/SINGLE: CPT | Performed by: FAMILY MEDICINE

## 2023-05-23 NOTE — PROGRESS NOTES
Assessment & Plan     Sebaceous cyst  - SKIN: Incision and drainage                 Beck Yen MD  [facility]    Lou Mascorro is a 53 year old, presenting for the following health issues:  Cyst (Back)        5/23/2023     2:31 PM   Additional Questions   Roomed by SRud   Accompanied by n/a     HPI     F/U Cyst on back, drained over weekend.        Review of Systems   Otherwise negative      Objective    /67 (BP Location: Left arm, Patient Position: Sitting)   Pulse 94   Temp 98.6  F (37  C) (Tympanic)   Resp 16   LMP  (LMP Unknown)   There is no height or weight on file to calculate BMI.  Physical Exam   [unfilled]    SKIN: Incision and drainage    Date/Time: 5/23/2023 3:03 PM    Performed by: Beck Yen MD  Authorized by: Beck Yen MD      UNIVERSAL PROTOCOL   Site Marked: Yes  Prior Images Obtained and Reviewed:  Yes  Required items: Required blood products, implants, devices and special equipment available    Patient identity confirmed:  Verbally with patient  Patient was reevaluated immediately before administering moderate or deep sedation or anesthesia  Confirmation Checklist:  Patient's identity using two indicators, relevant allergies, procedure was appropriate and matched the consent or emergent situation and correct equipment/implants were available  Time out: Immediately prior to the procedure a time out was called    Universal Protocol: the Joint Commission Universal Protocol was followed    Preparation: Patient was prepped and draped in usual sterile fashion      ANESTHESIA  Local infiltration  Local anesthesia used: yes  Local Anesthetic: lidocaine 1% without epinephrine  Anesthetic total: 3 mL    SEDATION    Patient Sedated: No      Type: cyst  Body area: trunk  Scalpel size: 11  Incision type: single straight  Incision depth: dermal  Complexity: simple  Drainage: serosanguinous and  bloody  Drainage amount:  moderate  Wound treatment: wound left open and  drain placed  Packing material: 1/2 in gauze      PROCEDURE    Patient Tolerance:  Patient tolerated the procedure well with no immediate complications and patient tolerated the procedure well with no immediate complications  Length of time physician/provider present for 1:1 monitoring during sedation: 0   After informed consent, timeout was called.  Patient was prepped and draped in the usual fashion.  Wound was anesthetized with 3 cc of 1% lidocaine without epinephrine.  A 2 cm incision was made over the cyst.  Wound was explored with a hemostat.  Large amount of drainage was removed.  Cavity was packed with half-inch plain gauze.  Large Band-Aid dressing was applied.  Patient tolerated procedure well.

## 2023-06-04 ENCOUNTER — HEALTH MAINTENANCE LETTER (OUTPATIENT)
Age: 53
End: 2023-06-04

## 2023-08-10 DIAGNOSIS — F41.9 ANXIETY AND DEPRESSION: ICD-10-CM

## 2023-08-10 DIAGNOSIS — F32.A ANXIETY AND DEPRESSION: ICD-10-CM

## 2023-08-10 RX ORDER — ESCITALOPRAM OXALATE 10 MG/1
10 TABLET ORAL DAILY
Qty: 90 TABLET | Refills: 1 | Status: SHIPPED | OUTPATIENT
Start: 2023-08-10 | End: 2023-11-29

## 2023-08-10 NOTE — TELEPHONE ENCOUNTER
"Routing refill request to provider for review/approval because:  PHQ9    PHQ-9 score:        5/19/2023     2:30 PM   PHQ   PHQ-9 Total Score 6   Q9: Thoughts of better off dead/self-harm past 2 weeks Not at all     Last Written Prescription Date:  5/19/23  Last Fill Quantity: 30,  # refills: 1   Last office visit provider:   5/23/23 with cynthia    Requested Prescriptions   Pending Prescriptions Disp Refills    escitalopram (LEXAPRO) 10 MG tablet [Pharmacy Med Name: ESCITALOPRAM 10MG TABLETS] 30 tablet 1     Sig: TAKE 1 TABLET(10 MG) BY MOUTH DAILY       SSRIs Protocol Failed - 8/10/2023 12:16 PM        Failed - PHQ-9 score less than 5 in past 6 months     Please review last PHQ-9 score.           Passed - Medication is active on med list        Passed - Patient is age 18 or older        Passed - No active pregnancy on record        Passed - No positive pregnancy test in last 12 months        Passed - Recent (6 mo) or future (30 days) visit within the authorizing provider's specialty     Patient had office visit in the last 6 months or has a visit in the next 30 days with authorizing provider or within the authorizing provider's specialty.  See \"Patient Info\" tab in inbasket, or \"Choose Columns\" in Meds & Orders section of the refill encounter.                 ELIEZER FERGUSON RN 08/10/23 12:16 PM  "

## 2023-11-29 DIAGNOSIS — F32.A ANXIETY AND DEPRESSION: ICD-10-CM

## 2023-11-29 DIAGNOSIS — F41.9 ANXIETY AND DEPRESSION: ICD-10-CM

## 2023-11-29 RX ORDER — ESCITALOPRAM OXALATE 10 MG/1
10 TABLET ORAL DAILY
Qty: 90 TABLET | Refills: 1 | Status: SHIPPED | OUTPATIENT
Start: 2023-11-29 | End: 2024-07-26

## 2024-07-21 ENCOUNTER — HEALTH MAINTENANCE LETTER (OUTPATIENT)
Age: 54
End: 2024-07-21

## 2024-07-26 DIAGNOSIS — F41.9 ANXIETY AND DEPRESSION: ICD-10-CM

## 2024-07-26 DIAGNOSIS — F32.A ANXIETY AND DEPRESSION: ICD-10-CM

## 2024-07-26 RX ORDER — ESCITALOPRAM OXALATE 10 MG/1
10 TABLET ORAL DAILY
Qty: 90 TABLET | Refills: 0 | Status: SHIPPED | OUTPATIENT
Start: 2024-07-26

## 2024-07-26 NOTE — TELEPHONE ENCOUNTER
Overdue for physical, please have her schedule with me or Dr. Yen, she is due for Pap this year.  I sent another 3 months of her medication but would like to her to get on my schedule before she runs out

## 2025-01-17 ENCOUNTER — ANCILLARY PROCEDURE (OUTPATIENT)
Dept: GENERAL RADIOLOGY | Facility: CLINIC | Age: 55
End: 2025-01-17
Attending: NURSE PRACTITIONER
Payer: COMMERCIAL

## 2025-01-17 DIAGNOSIS — E28.2 PCOS (POLYCYSTIC OVARIAN SYNDROME): ICD-10-CM

## 2025-01-17 DIAGNOSIS — G89.29 CHRONIC PAIN OF LEFT KNEE: ICD-10-CM

## 2025-01-17 DIAGNOSIS — M25.562 CHRONIC PAIN OF LEFT KNEE: ICD-10-CM

## 2025-01-17 DIAGNOSIS — R06.2 WHEEZING: ICD-10-CM

## 2025-01-17 PROBLEM — F41.9 ANXIETY AND DEPRESSION: Status: ACTIVE | Noted: 2025-01-17

## 2025-01-17 PROBLEM — F32.A ANXIETY AND DEPRESSION: Status: ACTIVE | Noted: 2025-01-17

## 2025-01-17 PROCEDURE — 73562 X-RAY EXAM OF KNEE 3: CPT | Mod: TC | Performed by: RADIOLOGY

## 2025-01-20 ENCOUNTER — PATIENT OUTREACH (OUTPATIENT)
Dept: CARE COORDINATION | Facility: CLINIC | Age: 55
End: 2025-01-20
Payer: COMMERCIAL

## 2025-01-20 RX ORDER — METFORMIN HYDROCHLORIDE 500 MG/1
TABLET, EXTENDED RELEASE ORAL
Qty: 225 TABLET | Refills: 1 | Status: SHIPPED | OUTPATIENT
Start: 2025-01-20

## 2025-01-20 RX ORDER — ALBUTEROL SULFATE 90 UG/1
2 INHALANT RESPIRATORY (INHALATION) EVERY 6 HOURS PRN
Qty: 18 G | Refills: 0 | Status: SHIPPED | OUTPATIENT
Start: 2025-01-20

## 2025-01-20 NOTE — TELEPHONE ENCOUNTER
RN called to speak with patient.  Patient has not started medication.  RX written on 1/17/25.    RN sending original RX, as [facility] is having technical issues.

## 2025-01-22 ENCOUNTER — OFFICE VISIT (OUTPATIENT)
Dept: EDUCATION SERVICES | Facility: CLINIC | Age: 55
End: 2025-01-22
Payer: COMMERCIAL

## 2025-01-22 ENCOUNTER — TELEPHONE (OUTPATIENT)
Dept: EDUCATION SERVICES | Facility: CLINIC | Age: 55
End: 2025-01-22

## 2025-01-22 VITALS — BODY MASS INDEX: 50.04 KG/M2 | HEIGHT: 63 IN | WEIGHT: 282.41 LBS

## 2025-01-22 DIAGNOSIS — E66.9 OBESITY: Primary | ICD-10-CM

## 2025-01-22 DIAGNOSIS — E88.819 INSULIN RESISTANCE: ICD-10-CM

## 2025-01-22 PROCEDURE — 97802 MEDICAL NUTRITION INDIV IN: CPT | Performed by: DIETITIAN, REGISTERED

## 2025-01-22 PROCEDURE — 99207 PR DROP WITH A PROCEDURE: CPT | Performed by: DIETITIAN, REGISTERED

## 2025-01-22 SDOH — HEALTH STABILITY: PHYSICAL HEALTH: ON AVERAGE, HOW MANY DAYS PER WEEK DO YOU ENGAGE IN MODERATE TO STRENUOUS EXERCISE (LIKE A BRISK WALK)?: 0 DAYS

## 2025-01-22 ASSESSMENT — ANXIETY QUESTIONNAIRES
3. WORRYING TOO MUCH ABOUT DIFFERENT THINGS: NEARLY EVERY DAY
1. FEELING NERVOUS, ANXIOUS, OR ON EDGE: MORE THAN HALF THE DAYS
6. BECOMING EASILY ANNOYED OR IRRITABLE: MORE THAN HALF THE DAYS
8. IF YOU CHECKED OFF ANY PROBLEMS, HOW DIFFICULT HAVE THESE MADE IT FOR YOU TO DO YOUR WORK, TAKE CARE OF THINGS AT HOME, OR GET ALONG WITH OTHER PEOPLE?: SOMEWHAT DIFFICULT
GAD7 TOTAL SCORE: 13
5. BEING SO RESTLESS THAT IT IS HARD TO SIT STILL: SEVERAL DAYS
GAD7 TOTAL SCORE: 13
7. FEELING AFRAID AS IF SOMETHING AWFUL MIGHT HAPPEN: NOT AT ALL
7. FEELING AFRAID AS IF SOMETHING AWFUL MIGHT HAPPEN: NOT AT ALL
GAD7 TOTAL SCORE: 13
4. TROUBLE RELAXING: MORE THAN HALF THE DAYS
2. NOT BEING ABLE TO STOP OR CONTROL WORRYING: NEARLY EVERY DAY
IF YOU CHECKED OFF ANY PROBLEMS ON THIS QUESTIONNAIRE, HOW DIFFICULT HAVE THESE PROBLEMS MADE IT FOR YOU TO DO YOUR WORK, TAKE CARE OF THINGS AT HOME, OR GET ALONG WITH OTHER PEOPLE: SOMEWHAT DIFFICULT

## 2025-01-22 ASSESSMENT — SOCIAL DETERMINANTS OF HEALTH (SDOH): HOW OFTEN DO YOU GET TOGETHER WITH FRIENDS OR RELATIVES?: TWICE A WEEK

## 2025-01-22 ASSESSMENT — PATIENT HEALTH QUESTIONNAIRE - PHQ9
SUM OF ALL RESPONSES TO PHQ QUESTIONS 1-9: 8
SUM OF ALL RESPONSES TO PHQ QUESTIONS 1-9: 8
10. IF YOU CHECKED OFF ANY PROBLEMS, HOW DIFFICULT HAVE THESE PROBLEMS MADE IT FOR YOU TO DO YOUR WORK, TAKE CARE OF THINGS AT HOME, OR GET ALONG WITH OTHER PEOPLE: SOMEWHAT DIFFICULT

## 2025-01-22 NOTE — PATIENT INSTRUCTIONS
1270 - 1500 calories  Zepbound: proper use, mechanism of action, indications, dosing, injection schedule, safety and side effects.  Pt is shown a demonstration of the use of the pen and was able to return demonstration without difficulty.   Dose Escalation Schedule    Start zepbound 2.5 mg once weekly for 4 weeks, then increase to 5 mg once weekly for 4 weeks.  If tolerating zepbound well, can increase to 7.5 mg per week after that.      Zepbound Dosing:   Start Zepbound: It is a subcutaneous injection that you inject once weekly and titrate the dose slowly over time. Make sure inject the same time each day of the week, for example Monday evenings.  Each pen is single use.  In each prescription, you will get 4 pens in each box.  You will need a new prescription for each strength of the medication.  Week 1-4: Inject 2.5 mg once weekly  Week 5-8: If tolerating, can increase to 5 mg once weekly if necessary  Week 9-12: If tolerating, can increase to 7.5 mg once weekly if necessary  Week 13-16: If tolerating, can increase to 10 mg once weekly if necessary  Week 17-20: If tolerating, can increase to 12.5 mg once weekly if necessary  Week 21 and on: if tolerating, can increase to 15 mg once weekly if necessary     The goal is to get to a dose that is well tolerated and effective for you. You do not have to go up on the dose each month or get to maximum dose if getting an effective response with minimal side effects.      *If you are having some nausea or other side effects to where you are hesitant to move up to the next dose, stay at the same dose you are on for an additional week to see if side effect(s) improves/resolves. Make sure to take this time to hydrate and ensure you are drinking at least 64 oz water per day. If you are wanting to stay at the same dose and do not have additional refills on that prescription please reach out to the clinic.     Zepbound Administration Video: Video that was created by the  .  https://www.zepbound.Netsizecom/how-to-use     Zepbound Copay Card:  https://www.zepbound.Played.com/coverage-savings     Zepbound Storage and Stability:   Make sure that when you get the prescription that you store the prescription in the refrigerator until it is time to use the Zepbound pen.  Once it is time to use the Zepbound pen, you can keep the pen at room temperature and it is good for up to 21 days at room temperature.      Zepbound Common Side Effects:   Nausea, diarrhea, constipation, headache, tiredness (fatigue), dizziness, stomach upset/pain. Less commonly, Zepbound can cause low blood sugar (symptoms: shaky, dizzy, sweaty, agitation). Please reach out to the care team should you feel like this is occurring. It is important to ensure that you are eating consistent meals and not skipping meals. Ensure you are getting at least 64 oz water daily.         Goals:  Practice healthy stress management (mindful eating, think are you physically hungry or are you board, stressed, emotional etc.) make of list of things to do besides eat.    Try to get good quality sleep with a goal of 7-8 hours per night.  Stay physically active on a daily basis and throughout the year.  Recommend a fitness tracker; gradually increase the average to a minimum of 6000 steps with the ultimate goal of 10,000 steps per day.      Eat in a healthy way; follow the plate method.  Keep a food record ([employer]; [employer]it).  Nutrition reference:  Eat 3 meals a day; snacks are optional.    A meal is 3 or more food groups; make it colorful for better nutrition.          .

## 2025-01-22 NOTE — LETTER
1/22/2025         RE: Subha Howell  [id number] [facility] [address]        Dear Colleague,    Thank you for referring your patient, Subha Howell, to the [facility]. Please see a copy of my visit note below.        Medical Nutrition Therapy  Visit Type:Initial assessment and intervention    Subha Howell presents today for MNT and education related to weight management for class 3 obesity Body mass index is 50.83 kg/m .  She is accompanied by self.     ASSESSMENT:   Patient comments/concerns relating to nutrition: Patient has been on diet without successful long-term results.  Patient states that the keto meal plan worked best and was able to achieve weight of 180 pounds, patient also has been trying intermittent fasting.  Patient notes after keto diet stopped weight regain plus 20 pounds.  Patient is interested in weight loss counseling and weight loss medication.  Brought into patient's medication benefits and Zepbound will be approved after prior authorization completed.  Zepbound also has been indicated for treatment of sleep apnea and patient may be having a sleep study in the future.  Over the last 7 years since 6/2018 ~ 83# weight gain.  Weight stable since 5/2023.    Wt Readings from Last 30 Encounters:   01/22/25 128.1 kg (282 lb 6.6 oz)   01/22/25 128.1 kg (282 lb 8 oz)   01/17/25 127.4 kg (280 lb 15 oz)   05/19/23 127.9 kg (282 lb)   01/03/20 116.2 kg (256 lb 3.2 oz)   10/19/18 93.7 kg (206 lb 9.6 oz)   06/01/18 90.2 kg (198 lb 12.8 oz)   12/06/17 100.2 kg (221 lb)   09/12/17 105.1 kg (231 lb 9.6 oz)   06/10/13 96.2 kg (212 lb)     NUTRITION HISTORY:  Patient reports meals being primarily all home-cooked does not have fast food often at all.  Patient's hunger fullness cues are not very predominant.  Patient can go a long time without eating without feeling hungry but then may overeat at the next meal.  Breakfast: Does not typically have breakfast  Lunch: liam  "board type foods (meat cheese pickles olives), fruit  Dinner: Protein and vegetables typically occasionally a starch but not often  Snacks: Typically meat and cheese   Beverages: water, a few diet sodas per week, bubbly water, coffee with oat creamer     Misses meals? Breakfast   Eats out:  1- 2 meals/per month     Previous diet education:  No     Food allergies/intolerances: potential gluten intolerance     Diet is high in: calories  Diet is low in: fat (saturated) and fiber    EXERCISE: minimal exercise due to knee and back pain    SOCIO/ECONOMIC:   Lives with: spouse and children    MEDICATIONS:  Current Outpatient Medications   Medication Sig Dispense Refill     metFORMIN (GLUCOPHAGE XR) 500 MG 24 hr tablet TAKE 1 TABLET DAILY WITH A MEAL X 1 WEEK, 1 TABLET TWICE DAILY WITH MEALS X 1 WEEK, 1 IN AM& 2 IN PM X 1 WEEK, THEN 2 TWICE DAILY 225 tablet 1     albuterol (PROAIR HFA/PROVENTIL HFA/VENTOLIN HFA) 108 (90 Base) MCG/ACT inhaler Inhale 2 puffs into the lungs every 6 hours as needed for shortness of breath, wheezing or cough. 18 g 0     escitalopram (LEXAPRO) 10 MG tablet Take 1 tablet (10 mg) by mouth daily. 90 tablet 0     Misc Natural Products (GLUCOSAMINE CHOND MSM FORMULA PO) Take by mouth daily.       No current facility-administered medications for this visit.       LABS:  No results found for: \"NA\"   No results found for: \"POTASSIUM\"  No results found for: \"CHLORIDE\"  No results found for: \"RAJIV\"  No results found for: \"CO2\"  No results found for: \"BUN\"  Lab Results   Component Value Date    CR 0.69 12/06/2017     Lab Results   Component Value Date    GLC 92 12/06/2017     No results found for: \"LDL\"  No results found for: \"HDL\"]  No results found for: \"GFRESTIMATED\"  Lab Results   Component Value Date    CR 0.69 12/06/2017     No results found for: \"MICROALBUMIN\"    ANTHROPOMETRICS:  Vitals: Ht 1.588 m (5' 2.5\")   Wt 128.1 kg (282 lb 6.6 oz)   LMP  (LMP Unknown)   BMI 50.83 kg/m    Body mass index is " 50.83 kg/m .      Wt Readings from Last 5 Encounters:   01/22/25 128.1 kg (282 lb 6.6 oz)   01/22/25 128.1 kg (282 lb 8 oz)   01/17/25 127.4 kg (280 lb 15 oz)   05/19/23 127.9 kg (282 lb)   01/03/20 116.2 kg (256 lb 3.2 oz)       Weight Change: up ~26# since 1/2020    ESTIMATED KCAL REQUIREMENTS:   kcal per day    NUTRITION DIAGNOSIS: Overweight/Obesity related to increased calorie intake as evidenced by BMI Body mass index is 50.83 kg/m .     NUTRITION INTERVENTION:  Nutrition Prescription: Energy Intake: 1270 - 1500 kcal/day  Education given to support: general nutrition guidelines, weight reduction, exercise and portion control  Education Materials Provided: My Plate Planner/Choose My Plate and Fiber Facts    Discussed what dietary changes has worked well for her in the past and how to incorporate them again.  Discussion on healthy behavior changes that can promote calorie reduction in diet.    We discussed why it is important to incorporate healthy lifestyle interventions to control overall health to prevent complications of being obese including the potential of developing diabetes and preventing heart disease.      Nutritional Psychiatry Your Brain on Food - discussion on how what you eat affects microbiome/ hormones and how you feel physically and emotionally.    Hunger/ Fullness cues - help identify how pt is feeling before, during, and after eating   Smart Snacking and 20 Ways to eat more fruit and vegetables.    Mindful eating - listening to body vs eating out of stress, boredom, emotion, eating to fuel body for strength and energy   Tracking food - balance of meals and snacks   Importance of daily physical activity as able to promote endorphin release       reduce stress, anxiety, and depression, improve sleep, increase energy level, improve muscle tone and strength   Recommend: Discussed options for medications and reduced calorie dietary guidelines.    Zepbound: proper use, mechanism of action,  indications, dosing, injection schedule, safety and side effects.  Pt is shown a demonstration of the use of the pen and was able to return demonstration without difficulty.   Start Zepbound at   2.5 mg weekly x 4 weeks step therapy   5.0 mg weekly   ----Continue to assess if increase needed for weight control  7.5 mg weekly step therapy  10 mg weekly   12.5 mg weekly step therapy  15 mg weekly goal dose    Patient recently started on metformin for PCOS depending on GI intolerance may opt to discontinue metformin and stay with Mounjaro  PATIENT'S BEHAVIOR CHANGE GOALS:   See Patient Instructions for patient stated behavior change goals. AVS was printed and given to patient at today's appointment.    MONITOR / EVALUATE:  RD will monitor/evaluate:  Pertinent Labs  Weight change    FOLLOW-UP:  Every 3 mo with PCP or RD    Time spent in minutes: 45  Encounter: Individual

## 2025-01-22 NOTE — PROGRESS NOTES
Medical Nutrition Therapy  Visit Type:Initial assessment and intervention    Subha Howell presents today for MNT and education related to weight management for class 3 obesity Body mass index is 50.83 kg/m .  She is accompanied by self.     ASSESSMENT:   Patient comments/concerns relating to nutrition: Patient has been on diet without successful long-term results.  Patient states that the keto meal plan worked best and was able to achieve weight of 180 pounds, patient also has been trying intermittent fasting.  Patient notes after keto diet stopped weight regain plus 20 pounds.  Patient is interested in weight loss counseling and weight loss medication.  Brought into patient's medication benefits and Zepbound will be approved after prior authorization completed.  Zepbound also has been indicated for treatment of sleep apnea and patient may be having a sleep study in the future.  Over the last 7 years since 6/2018 ~ 83# weight gain.  Weight stable since 5/2023.    Wt Readings from Last 30 Encounters:   01/22/25 128.1 kg (282 lb 6.6 oz)   01/22/25 128.1 kg (282 lb 8 oz)   01/17/25 127.4 kg (280 lb 15 oz)   05/19/23 127.9 kg (282 lb)   01/03/20 116.2 kg (256 lb 3.2 oz)   10/19/18 93.7 kg (206 lb 9.6 oz)   06/01/18 90.2 kg (198 lb 12.8 oz)   12/06/17 100.2 kg (221 lb)   09/12/17 105.1 kg (231 lb 9.6 oz)   06/10/13 96.2 kg (212 lb)     NUTRITION HISTORY:  Patient reports meals being primarily all home-cooked does not have fast food often at all.  Patient's hunger fullness cues are not very predominant.  Patient can go a long time without eating without feeling hungry but then may overeat at the next meal.  Breakfast: Does not typically have breakfast  Lunch: charcuterie board type foods (meat cheese pickles olives), fruit  Dinner: Protein and vegetables typically occasionally a starch but not often  Snacks: Typically meat and cheese   Beverages: water, a few diet sodas per week, bubbly water, coffee with oat creamer  "    Misses meals? Breakfast   Eats out:  1- 2 meals/per month     Previous diet education:  No     Food allergies/intolerances: potential gluten intolerance     Diet is high in: calories  Diet is low in: fat (saturated) and fiber    EXERCISE: minimal exercise due to knee and back pain    SOCIO/ECONOMIC:   Lives with: spouse and children    MEDICATIONS:  Current Outpatient Medications   Medication Sig Dispense Refill    metFORMIN (GLUCOPHAGE XR) 500 MG 24 hr tablet TAKE 1 TABLET DAILY WITH A MEAL X 1 WEEK, 1 TABLET TWICE DAILY WITH MEALS X 1 WEEK, 1 IN AM& 2 IN PM X 1 WEEK, THEN 2 TWICE DAILY 225 tablet 1    albuterol (PROAIR HFA/PROVENTIL HFA/VENTOLIN HFA) 108 (90 Base) MCG/ACT inhaler Inhale 2 puffs into the lungs every 6 hours as needed for shortness of breath, wheezing or cough. 18 g 0    escitalopram (LEXAPRO) 10 MG tablet Take 1 tablet (10 mg) by mouth daily. 90 tablet 0    Misc Natural Products (GLUCOSAMINE CHOND MSM FORMULA PO) Take by mouth daily.       No current facility-administered medications for this visit.       LABS:  No results found for: \"NA\"   No results found for: \"POTASSIUM\"  No results found for: \"CHLORIDE\"  No results found for: \"RAJIV\"  No results found for: \"CO2\"  No results found for: \"BUN\"  Lab Results   Component Value Date    CR 0.69 12/06/2017     Lab Results   Component Value Date    GLC 92 12/06/2017     No results found for: \"LDL\"  No results found for: \"HDL\"]  No results found for: \"GFRESTIMATED\"  Lab Results   Component Value Date    CR 0.69 12/06/2017     No results found for: \"MICROALBUMIN\"    ANTHROPOMETRICS:  Vitals: Ht 1.588 m (5' 2.5\")   Wt 128.1 kg (282 lb 6.6 oz)   LMP  (LMP Unknown)   BMI 50.83 kg/m    Body mass index is 50.83 kg/m .      Wt Readings from Last 5 Encounters:   01/22/25 128.1 kg (282 lb 6.6 oz)   01/22/25 128.1 kg (282 lb 8 oz)   01/17/25 127.4 kg (280 lb 15 oz)   05/19/23 127.9 kg (282 lb)   01/03/20 116.2 kg (256 lb 3.2 oz)       Weight Change: up ~26# " since 1/2020    ESTIMATED KCAL REQUIREMENTS:   kcal per day    NUTRITION DIAGNOSIS: Overweight/Obesity related to increased calorie intake as evidenced by BMI Body mass index is 50.83 kg/m .     NUTRITION INTERVENTION:  Nutrition Prescription: Energy Intake: 1270 - 1500 kcal/day  Education given to support: general nutrition guidelines, weight reduction, exercise and portion control  Education Materials Provided: My Plate Planner/Choose My Plate and Fiber Facts    Discussed what dietary changes has worked well for her in the past and how to incorporate them again.  Discussion on healthy behavior changes that can promote calorie reduction in diet.    We discussed why it is important to incorporate healthy lifestyle interventions to control overall health to prevent complications of being obese including the potential of developing diabetes and preventing heart disease.      Nutritional Psychiatry Your Brain on Food - discussion on how what you eat affects microbiome/ hormones and how you feel physically and emotionally.    Hunger/ Fullness cues - help identify how pt is feeling before, during, and after eating   Smart Snacking and 20 Ways to eat more fruit and vegetables.    Mindful eating - listening to body vs eating out of stress, boredom, emotion, eating to fuel body for strength and energy   Tracking food - balance of meals and snacks   Importance of daily physical activity as able to promote endorphin release       reduce stress, anxiety, and depression, improve sleep, increase energy level, improve muscle tone and strength   Recommend: Discussed options for medications and reduced calorie dietary guidelines.    Zepbound: proper use, mechanism of action, indications, dosing, injection schedule, safety and side effects.  Pt is shown a demonstration of the use of the pen and was able to return demonstration without difficulty.   Start Zepbound at   2.5 mg weekly x 4 weeks step therapy   5.0 mg weekly    ----Continue to assess if increase needed for weight control  7.5 mg weekly step therapy  10 mg weekly   12.5 mg weekly step therapy  15 mg weekly goal dose    Patient recently started on metformin for PCOS depending on GI intolerance may opt to discontinue metformin and stay with Mounjaro  PATIENT'S BEHAVIOR CHANGE GOALS:   See Patient Instructions for patient stated behavior change goals. AVS was printed and given to patient at today's appointment.    MONITOR / EVALUATE:  RD will monitor/evaluate:  Pertinent Labs  Weight change    FOLLOW-UP:  Every 3 mo with PCP or RD    Time spent in minutes: 45  Encounter: Individual

## 2025-01-23 PROBLEM — E88.819 INSULIN RESISTANCE: Status: ACTIVE | Noted: 2025-01-23

## 2025-01-23 RX ORDER — TIRZEPATIDE 2.5 MG/.5ML
2.5 INJECTION, SOLUTION SUBCUTANEOUS
Qty: 2 ML | Refills: 0 | Status: SHIPPED | OUTPATIENT
Start: 2025-01-23 | End: 2025-02-20

## 2025-01-23 RX ORDER — TIRZEPATIDE 5 MG/.5ML
5 INJECTION, SOLUTION SUBCUTANEOUS
Qty: 2 ML | Refills: 2 | Status: SHIPPED | OUTPATIENT
Start: 2025-02-16

## 2025-01-29 ENCOUNTER — OFFICE VISIT (OUTPATIENT)
Dept: FAMILY MEDICINE | Facility: CLINIC | Age: 55
End: 2025-01-29
Payer: COMMERCIAL

## 2025-01-29 VITALS
HEIGHT: 63 IN | DIASTOLIC BLOOD PRESSURE: 84 MMHG | WEIGHT: 282 LBS | HEART RATE: 86 BPM | SYSTOLIC BLOOD PRESSURE: 118 MMHG | RESPIRATION RATE: 18 BRPM | BODY MASS INDEX: 49.96 KG/M2 | OXYGEN SATURATION: 97 % | TEMPERATURE: 97.8 F

## 2025-01-29 DIAGNOSIS — Z13.29 SCREENING FOR THYROID DISORDER: ICD-10-CM

## 2025-01-29 DIAGNOSIS — N91.2 AMENORRHEA: ICD-10-CM

## 2025-01-29 DIAGNOSIS — Z11.59 NEED FOR HEPATITIS C SCREENING TEST: ICD-10-CM

## 2025-01-29 DIAGNOSIS — Z11.4 SCREENING FOR HIV (HUMAN IMMUNODEFICIENCY VIRUS): ICD-10-CM

## 2025-01-29 DIAGNOSIS — Z83.49 FAMILY HISTORY OF THYROID DISEASE: ICD-10-CM

## 2025-01-29 DIAGNOSIS — Z00.00 ROUTINE GENERAL MEDICAL EXAMINATION AT A HEALTH CARE FACILITY: ICD-10-CM

## 2025-01-29 DIAGNOSIS — E66.01 SEVERE OBESITY (BMI >= 40) (H): ICD-10-CM

## 2025-01-29 DIAGNOSIS — Z12.4 CERVICAL CANCER SCREENING: ICD-10-CM

## 2025-01-29 DIAGNOSIS — Z13.1 SCREENING FOR DIABETES MELLITUS: ICD-10-CM

## 2025-01-29 DIAGNOSIS — E28.2 PCOS (POLYCYSTIC OVARIAN SYNDROME): ICD-10-CM

## 2025-01-29 DIAGNOSIS — Z12.11 SCREEN FOR COLON CANCER: Primary | ICD-10-CM

## 2025-01-29 DIAGNOSIS — Z13.220 LIPID SCREENING: ICD-10-CM

## 2025-01-29 LAB
CHOLEST SERPL-MCNC: 195 MG/DL
EST. AVERAGE GLUCOSE BLD GHB EST-MCNC: 148 MG/DL
FASTING STATUS PATIENT QL REPORTED: ABNORMAL
FASTING STATUS PATIENT QL REPORTED: NORMAL
FSH SERPL IRP2-ACNC: 22.9 MIU/ML
GLUCOSE SERPL-MCNC: 126 MG/DL (ref 70–99)
HBA1C MFR BLD: 6.8 % (ref 0–5.6)
HCV AB SERPL QL IA: NONREACTIVE
HDLC SERPL-MCNC: 80 MG/DL
HIV 1+2 AB+HIV1 P24 AG SERPL QL IA: NONREACTIVE
LDLC SERPL CALC-MCNC: 97 MG/DL
NONHDLC SERPL-MCNC: 115 MG/DL
T3FREE SERPL-MCNC: 3.3 PG/ML (ref 2–4.4)
TRIGL SERPL-MCNC: 92 MG/DL
TSH SERPL DL<=0.005 MIU/L-ACNC: 2.31 UIU/ML (ref 0.3–4.2)

## 2025-01-29 PROCEDURE — 87624 HPV HI-RISK TYP POOLED RSLT: CPT | Performed by: NURSE PRACTITIONER

## 2025-01-29 PROCEDURE — 84443 ASSAY THYROID STIM HORMONE: CPT | Performed by: NURSE PRACTITIONER

## 2025-01-29 PROCEDURE — 80061 LIPID PANEL: CPT | Performed by: NURSE PRACTITIONER

## 2025-01-29 PROCEDURE — 86376 MICROSOMAL ANTIBODY EACH: CPT | Performed by: NURSE PRACTITIONER

## 2025-01-29 PROCEDURE — 84481 FREE ASSAY (FT-3): CPT | Performed by: NURSE PRACTITIONER

## 2025-01-29 PROCEDURE — 86803 HEPATITIS C AB TEST: CPT | Performed by: NURSE PRACTITIONER

## 2025-01-29 PROCEDURE — 83036 HEMOGLOBIN GLYCOSYLATED A1C: CPT | Performed by: NURSE PRACTITIONER

## 2025-01-29 PROCEDURE — 82947 ASSAY GLUCOSE BLOOD QUANT: CPT | Mod: QW | Performed by: NURSE PRACTITIONER

## 2025-01-29 PROCEDURE — 99214 OFFICE O/P EST MOD 30 MIN: CPT | Mod: 25 | Performed by: NURSE PRACTITIONER

## 2025-01-29 PROCEDURE — 83001 ASSAY OF GONADOTROPIN (FSH): CPT | Performed by: NURSE PRACTITIONER

## 2025-01-29 PROCEDURE — 87389 HIV-1 AG W/HIV-1&-2 AB AG IA: CPT | Performed by: NURSE PRACTITIONER

## 2025-01-29 PROCEDURE — 99396 PREV VISIT EST AGE 40-64: CPT | Performed by: NURSE PRACTITIONER

## 2025-01-29 PROCEDURE — 36415 COLL VENOUS BLD VENIPUNCTURE: CPT | Performed by: NURSE PRACTITIONER

## 2025-01-29 NOTE — PROGRESS NOTES
Preventive Care Visit  [facility]  Dorothea Ellsworth NP, Family Medicine  Jan 29, 2025      Assessment & Plan     (Z12.11) Screen for colon cancer  (primary encounter diagnosis)  Comment:   Plan: Colonoscopy Screening  Referral        Family history of colon polyps.  She last had colonoscopy in 2018 and is due    (Z12.4) Cervical cancer screening  Comment:   Plan: HPV and Gynecologic Cytology Panel -         Recommended Age 30 - 65 Years        There is a note in her chart for Pap every 3 years but I do not see one on file.  I had great difficulty today reviewing the cervix because of vaginal wall collapse and body habitus.  I did do a blind Pap smear    (Z00.00) Routine general medical examination at a health care facility  Comment:   Plan:     (Z83.49) Family history of thyroid disease  Comment:   Plan: TSH with free T4 reflex, Thyroid peroxidase         antibody, T3, Free        She has strong family history of thyroid disease has been gaining weight for the last 5 years, gained over 100 pounds.    (Z13.220) Lipid screening  Comment:   Plan: Lipid panel reflex to direct LDL Fasting            (E28.2) PCOS (polycystic ovarian syndrome)  Comment:   Plan:     (Z13.1) Screening for diabetes mellitus  Comment:   Plan: Hemoglobin A1c            (N91.2) Amenorrhea  Comment:   Plan: Follicle stimulating hormone        Has underlying polycystic ovary disease although she has never been diagnosed with cysts on her ovaries although she often goes many many months without a period.  She has not had a period now for 18 months, given her history I asked her if we could do an FSH.  Certainly if her FSH level is low suggesting she is not postmenopausal I would encourage her to see OB/GYN to consider endometrial biopsy.    She met last week with Noni Connor and was prescribed a weight loss med and it was not covered.  She believes that Noni had a follow-up plan.  I asked her about paying  out-of-pocket through the [facility] compound[facility].  She thought that might be possible, I will reach out to Noni about her thoughts.      Addendum 1/30/2025.  I did reach out to Noni Connor RD and she thought the compounding pharmacy through [facility] might be the best/most affordable next step.  I will send a prescription there for the Wegovy injectable GLP1 wt loss medication and they should reach out to  you regarding cost etc.  Let me know what you think. Other options might include a referral to [facility] program where , among other options, you could look into weight loss surgery.          Lou aMscorro is a 54 year old, presenting for the following: here for an annual exam, patient is fasting this morning for labs    Struggle with wt after knee injury and starting working from home. Had recent xray with minimal deg changes. No specific recent injury. Is going to work on wt loss at this time, can see ortho if not seeing reduction in pain      HS--onset 20 years,  worsening  Physical        1/29/2025     9:10 AM   Additional Questions   Roomed by tru lainez   Accompanied by self          HPI    Answers submitted by the patient for this visit:  Patient Health Questionnaire (Submitted on 1/22/2025)  If you checked off any problems, how difficult have these problems made it for you to do your work, take care of things at home, or get along with other people?: Somewhat difficult  PHQ9 TOTAL SCORE: 8  Patient Health Questionnaire (G7) (Submitted on 1/22/2025)  CARIDAD 7 TOTAL SCORE: 13        Health Care Directive  Patient does not have a Health Care Directive: Discussed advance care planning with patient; information given to patient to review.      1/22/2025   General Health   How would you rate your overall physical health? (!) FAIR   Feel stress (tense, anxious, or unable to sleep) Very much   (!) STRESS CONCERN      1/22/2025   Nutrition   Three or more servings of calcium each day? (!) I  "DON'T KNOW   Diet: Gluten-free/reduced   How many servings of fruit and vegetables per day? (!) 2-3   How many sweetened beverages each day? 0-1         1/22/2025   Exercise   Days per week of moderate/strenous exercise 0 days   (!) EXERCISE CONCERN      1/22/2025   Social Factors   Frequency of gathering with friends or relatives Twice a week   Worry food won't last until get money to buy more No   Food not last or not have enough money for food? No   Do you have housing? (Housing is defined as stable permanent housing and does not include staying ouside in a car, in a tent, in an abandoned building, in an overnight shelter, or couch-surfing.) Yes   Are you worried about losing your housing? No   Lack of transportation? No   Unable to get utilities (heat,electricity)? No         1/22/2025   Fall Risk   Fallen 2 or more times in the past year? No   Trouble with walking or balance? No          1/22/2025   Dental   Dentist two times every year? Yes         1/22/2025   TB Screening   Were you born outside of the [place]? No           Today's PHQ-2 Score:       1/17/2025     1:40 PM   PHQ-2 ( 1999 [employer])   Q1: Little interest or pleasure in doing things 1   Q2: Feeling down, depressed or hopeless 1   PHQ-2 Score 2    Q1: Little interest or pleasure in doing things Several days   Q2: Feeling down, depressed or hopeless Several days   PHQ-2 Score 2       Patient-reported         1/22/2025   Substance Use   Alcohol more than 3/day or more than 7/wk No   Do you use any other substances recreationally? No     Social History     Tobacco Use    Smoking status: Never     Passive exposure: Never    Smokeless tobacco: Never   Vaping Use    Vaping status: Never Used   Substance Use Topics    Alcohol use: Yes     Comment: \"occasional\"          Mammogram Screening - Mammogram every 1-2 years updated in Health Maintenance based on mutual decision making          1/22/2025   One time HIV Screening   Previous HIV test? Yes         " "1/22/2025   STI Screening   New sexual partner(s) since last STI/HIV test? No     History of abnormal Pap smear: unclear when last pap was, todays exam limited by body habitus, I could not view cervix       ASCVD Risk   The ASCVD Risk score (Tony LOGAN, et al., 2019) failed to calculate for the following reasons:    Cannot find a previous HDL lab    Cannot find a previous total cholesterol lab           Reviewed and updated as needed this visit by Provider                             Objective    Exam  /84 (BP Location: Right arm, Patient Position: Sitting)   Pulse 86   Temp 97.8  F (36.6  C)   Resp 18   Ht 1.588 m (5' 2.5\")   Wt 127.9 kg (282 lb)   LMP  (LMP Unknown)   SpO2 97%   Breastfeeding No   BMI 50.76 kg/m     Estimated body mass index is 50.76 kg/m  as calculated from the following:    Height as of this encounter: 1.588 m (5' 2.5\").    Weight as of this encounter: 127.9 kg (282 lb).    Physical Exam  GENERAL: alert and no distress  EYES: Eyes grossly normal to inspection, PERRL and conjunctivae and sclerae normal  HENT: ear canals and TM's normal, nose and mouth without ulcers or lesions  NECK: no adenopathy, no asymmetry, masses, or scars  RESP: lungs clear to auscultation - no rales, rhonchi or wheezes  BREAST: normal without masses, tenderness or nipple discharge and no palpable axillary masses or adenopathy  CV: regular rate and rhythm, normal S1 S2, no S3 or S4, no murmur, click or rub, no peripheral edema  ABDOMEN: soft, nontender, no hepatosplenomegaly, no masses and bowel sounds normal   (female) w/bimanual: normal female external genitalia, normal urethral meatus, normal vaginal mucosa, could not view cervix/adnexa/uterus difficult to asses due to body habitus  MS: no gross musculoskeletal defects noted, no edema        Signed Electronically by: Dorothea Ellsworth NP    "

## 2025-01-29 NOTE — PATIENT INSTRUCTIONS
Patient Education   Preventive Care Advice   This is general advice given by our system to help you stay healthy. However, your care team may have specific advice just for you. Please talk to your care team about your preventive care needs.  Nutrition  Eat 5 or more servings of fruits and vegetables each day.  Try wheat bread, brown rice and whole grain pasta (instead of white bread, rice, and pasta).  Get enough calcium and vitamin D. Check the label on foods and aim for 100% of the RDA (recommended daily allowance).  Lifestyle  Exercise at least 150 minutes each week  (30 minutes a day, 5 days a week).  Do muscle strengthening activities 2 days a week. These help control your weight and prevent disease.  No smoking.  Wear sunscreen to prevent skin cancer.  Have a dental exam and cleaning every 6 months.  Yearly exams  See your health care team every year to talk about:  Any changes in your health.  Any medicines your care team has prescribed.  Preventive care, family planning, and ways to prevent chronic diseases.  Shots (vaccines)   HPV shots (up to age 26), if you've never had them before.  Hepatitis B shots (up to age 59), if you've never had them before.  COVID-19 shot: Get this shot when it's due.  Flu shot: Get a flu shot every year.  Tetanus shot: Get a tetanus shot every 10 years.  Pneumococcal, hepatitis A, and RSV shots: Ask your care team if you need these based on your risk.  Shingles shot (for age 50 and up)  General health tests  Diabetes screening:  Starting at age 35, Get screened for diabetes at least every 3 years.  If you are younger than age 35, ask your care team if you should be screened for diabetes.  Cholesterol test: At age 39, start having a cholesterol test every 5 years, or more often if advised.  Bone density scan (DEXA): At age 50, ask your care team if you should have this scan for osteoporosis (brittle bones).  Hepatitis C: Get tested at least once in your life.  STIs (sexually  transmitted infections)  Before age 24: Ask your care team if you should be screened for STIs.  After age 24: Get screened for STIs if you're at risk. You are at risk for STIs (including HIV) if:  You are sexually active with more than one person.  You don't use condoms every time.  You or a partner was diagnosed with a sexually transmitted infection.  If you are at risk for HIV, ask about PrEP medicine to prevent HIV.  Get tested for HIV at least once in your life, whether you are at risk for HIV or not.  Cancer screening tests  Cervical cancer screening: If you have a cervix, begin getting regular cervical cancer screening tests starting at age 21.  Breast cancer scan (mammogram): If you've ever had breasts, begin having regular mammograms starting at age 40. This is a scan to check for breast cancer.  Colon cancer screening: It is important to start screening for colon cancer at age 45.  Have a colonoscopy test every 10 years (or more often if you're at risk) Or, ask your provider about stool tests like a FIT test every year or Cologuard test every 3 years.  To learn more about your testing options, visit:   .  For help making a decision, visit:   https://bit.ly/te77653.  Prostate cancer screening test: If you have a prostate, ask your care team if a prostate cancer screening test (PSA) at age 55 is right for you.  Lung cancer screening: If you are a current or former smoker ages 50 to 80, ask your care team if ongoing lung cancer screenings are right for you.  For informational purposes only. Not to replace the advice of your health care provider. Copyright   2023 [facility]. All rights reserved. Clinically reviewed by the [facility] Transitions Program. [employer] 603945 - REV 01/24.  Learning About Stress  What is stress?     Stress is your body's response to a hard situation. Your body can have a physical, emotional, or mental response. Stress is a fact of life for most people, and it  affects everyone differently. What causes stress for you may not be stressful for someone else.  A lot of things can cause stress. You may feel stress when you go on a job interview, take a test, or run a race. This kind of short-term stress is normal and even useful. It can help you if you need to work hard or react quickly. For example, stress can help you finish an important job on time.  Long-term stress is caused by ongoing stressful situations or events. Examples of long-term stress include long-term health problems, ongoing problems at work, or conflicts in your family. Long-term stress can harm your health.  How does stress affect your health?  When you are stressed, your body responds as though you are in danger. It makes hormones that speed up your heart, make you breathe faster, and give you a burst of energy. This is called the fight-or-flight stress response. If the stress is over quickly, your body goes back to normal and no harm is done.  But if stress happens too often or lasts too long, it can have bad effects. Long-term stress can make you more likely to get sick, and it can make symptoms of some diseases worse. If you tense up when you are stressed, you may develop neck, shoulder, or low back pain. Stress is linked to high blood pressure and heart disease.  Stress also harms your emotional health. It can make you anderson, tense, or depressed. Your relationships may suffer, and you may not do well at work or school.  What can you do to manage stress?  You can try these things to help manage stress:   Do something active. Exercise or activity can help reduce stress. Walking is a great way to get started. Even everyday activities such as housecleaning or yard work can help.  Try yoga or daniel chi. These techniques combine exercise and meditation. You may need some training at first to learn them.  Do something you enjoy. For example, listen to music or go to a movie. Practice your hobby or do volunteer  "work.  Meditate. This can help you relax, because you are not worrying about what happened before or what may happen in the future.  Do guided imagery. Imagine yourself in any setting that helps you feel calm. You can use online videos, books, or a teacher to guide you.  Do breathing exercises. For example:  From a standing position, bend forward from the waist with your knees slightly bent. Let your arms dangle close to the floor.  Breathe in slowly and deeply as you return to a standing position. Roll up slowly and lift your head last.  Hold your breath for just a few seconds in the standing position.  Breathe out slowly and bend forward from the waist.  Let your feelings out. Talk, laugh, cry, and express anger when you need to. Talking with supportive friends or family, a counselor, or a david leader about your feelings is a healthy way to relieve stress. Avoid discussing your feelings with people who make you feel worse.  Write. It may help to write about things that are bothering you. This helps you find out how much stress you feel and what is causing it. When you know this, you can find better ways to cope.  What can you do to prevent stress?  You might try some of these things to help prevent stress:  Manage your time. This helps you find time to do the things you want and need to do.  Get enough sleep. Your body recovers from the stresses of the day while you are sleeping.  Get support. Your family, friends, and community can make a difference in how you experience stress.  Limit your news feed. Avoid or limit time on social media or news that may make you feel stressed.  Do something active. Exercise or activity can help reduce stress. Walking is a great way to get started.  Where can you learn more?  Go to https://www.[employer].net/patiented  Enter N032 in the search box to learn more about \"Learning About Stress.\"  Current as of: October 24, 2023  Content Version: 14.3    2024 [employer]. "   Care instructions adapted under license by your healthcare professional. If you have questions about a medical condition or this instruction, always ask your healthcare professional. [employer] disclaims any warranty or liability for your use of this information.    Learning About Depression Screening  What is depression screening?  Depression screening is a way to see if you have depression symptoms. It may be done by a doctor or counselor. It's often part of a routine checkup. That's because your mental health is just as important as your physical health.  Depression is a mental health condition that affects how you feel, think, and act. You may:  Have less energy.  Lose interest in your daily activities.  Feel sad and grouchy for a long time.  Depression is very common. It affects people of all ages.  Many things can lead to depression. Some people become depressed after they have a stroke or find out they have a major illness like cancer or heart disease. The death of a loved one or a breakup may lead to depression. It can run in families. Most experts believe that a combination of inherited genes and stressful life events can cause it.  What happens during screening?  You may be asked to fill out a form about your depression symptoms. You and the doctor will discuss your answers. The doctor may ask you more questions to learn more about how you think, act, and feel.  What happens after screening?  If you have symptoms of depression, your doctor will talk to you about your options.  Doctors usually treat depression with medicines or counseling. Often, combining the two works best. Many people don't get help because they think that they'll get over the depression on their own. But people with depression may not get better unless they get treatment.  The cause of depression is not well understood. There may be many factors involved. But if you have depression, it's not your fault.  A serious symptom  "of depression is thinking about death or suicide. If you or someone you care about talks about this or about feeling hopeless, get help right away.  It's important to know that depression can be treated. Medicine, counseling, and self-care may help.  Where can you learn more?  Go to https://www.Info.net/patiented  Enter T185 in the search box to learn more about \"Learning About Depression Screening.\"  Current as of: July 31, 2024  Content Version: 14.3    2024 [employer].   Care instructions adapted under license by your healthcare professional. If you have questions about a medical condition or this instruction, always ask your healthcare professional. [employer] disclaims any warranty or liability for your use of this information.       "

## 2025-01-30 LAB
HPV HR 12 DNA CVX QL NAA+PROBE: NEGATIVE
HPV16 DNA CVX QL NAA+PROBE: NEGATIVE
HPV18 DNA CVX QL NAA+PROBE: NEGATIVE
HUMAN PAPILLOMA VIRUS FINAL DIAGNOSIS: NORMAL
THYROPEROXIDASE AB SERPL-ACNC: <10 IU/ML

## 2025-02-03 LAB
BKR AP ASSOCIATED HPV REPORT: NORMAL
BKR LAB AP GYN ADEQUACY: NORMAL
BKR LAB AP GYN INTERPRETATION: NORMAL
BKR LAB AP LMP: NORMAL
BKR LAB AP PREVIOUS ABNORMAL: NORMAL
PATH REPORT.COMMENTS IMP SPEC: NORMAL
PATH REPORT.COMMENTS IMP SPEC: NORMAL
PATH REPORT.RELEVANT HX SPEC: NORMAL

## 2025-02-05 ENCOUNTER — TELEPHONE (OUTPATIENT)
Dept: FAMILY MEDICINE | Facility: CLINIC | Age: 55
End: 2025-02-05
Payer: COMMERCIAL

## 2025-02-05 DIAGNOSIS — E11.9 TYPE 2 DIABETES MELLITUS WITHOUT COMPLICATION, WITHOUT LONG-TERM CURRENT USE OF INSULIN (H): Primary | ICD-10-CM

## 2025-02-05 RX ORDER — TIRZEPATIDE 2.5 MG/.5ML
2.5 INJECTION, SOLUTION SUBCUTANEOUS
Qty: 2 ML | Refills: 0 | Status: SHIPPED | OUTPATIENT
Start: 2025-02-05

## 2025-02-05 RX ORDER — TIRZEPATIDE 7.5 MG/.5ML
7.5 INJECTION, SOLUTION SUBCUTANEOUS
Qty: 2 ML | Refills: 0 | Status: SHIPPED | OUTPATIENT
Start: 2025-02-05

## 2025-02-05 NOTE — TELEPHONE ENCOUNTER
Medication Question or Refill        What medication are you calling about (include dose and sig)?: Zepbound versus compounded Wegovy    Preferred Pharmacy:   [facility][id number] - [place], [place] - [address] [facility] [address] [facility]  [address] [address]  Phone: [phone] Fax: [phone]    [facility] - [place], [place] - [address]   [address] [facility] [address]  Phone: [phone] Fax: [phone]      Controlled Substance Agreement on file:   CSA -- Patient Level:    CSA: None found at the patient level.       Who prescribed the medication?: Dorothea Ellsworth NP    Do you need a refill? Patient states that she is getting called for both medications.        Do you have any questions or concerns?  Yes: Patient is being called by 2 different pharmacies regarding medications.  Patient would like clarity on medications and which to take.  Writer is unable to note appeal for Zepbound.  Patient requesting message be sent to Noni Connor dietician also.       Could we send this information to you in [facility] or would you prefer to receive a phone call?:   Patient would prefer a phone call   Okay to leave a detailed message?: Yes at Cell number on file:    Telephone Information:   Mobile [phone]

## 2025-02-06 ENCOUNTER — TELEPHONE (OUTPATIENT)
Dept: FAMILY MEDICINE | Facility: CLINIC | Age: 55
End: 2025-02-06
Payer: COMMERCIAL

## 2025-02-06 NOTE — TELEPHONE ENCOUNTER
Prior Authorization Retail Medication Request    Medication/Dose: MOUNJARO 2.5 MG/0.5ML SOAJ    Diagnosis and ICD code (if different than what is on RX):  E11.9, Z68.43, E28.2    New/renewal/insurance change PA/secondary ins. PA: New  Previously Tried and Failed:    Rationale:      Insurance   Primary:   Insurance ID:      Secondary (if applicable):  Insurance ID:     Pharmacy Information (if different than what is on RX)  Name:  [facility]  Phone: [phone]  Fax: [phone]    Clinic Information  Preferred routing pool for dept communication: [facility] Pool    CoverMyMeds Key: [id number]

## 2025-02-06 NOTE — TELEPHONE ENCOUNTER
Prior Authorization Retail Medication Request    Medication/Dose: Tirzepatide (MOUNJARO) 7.5 MG/0.5ML SOAJ     Diagnosis and ICD code (if different than what is on RX): E11.9, Z68.43, E28.2    New/renewal/insurance change PA/secondary ins. PA: New  Previously Tried and Failed:    Rationale:      Insurance   Primary:   Insurance ID:      Secondary (if applicable):  Insurance ID:      Pharmacy Information (if different than what is on RX)  Name:  [facility]  Phone: [phone]  Fax: [phone]    Clinic Information  Preferred routing pool for dept communication: [facility] Pool    CoverMyMeds Key: [id number]

## 2025-02-06 NOTE — TELEPHONE ENCOUNTER
Prior Authorization Retail Medication Request    Medication/Dose: Tirzepatide 5 MG/0.5ML SOAJ    Diagnosis and ICD code (if different than what is on RX): E11.9, Z68.43, E28.2    New/renewal/insurance change PA/secondary ins. PA: New  Previously Tried and Failed:    Rationale:      Insurance   Primary:   Insurance ID:      Secondary (if applicable):  Insurance ID:      Pharmacy Information (if different than what is on RX)  Name: [facility]   Phone: [phone]  Fax: [phone]    Clinic Information  Preferred routing pool for dept communication: [facility] Pool    CoverMyMeds Key: [id number]

## 2025-02-10 NOTE — TELEPHONE ENCOUNTER
Received a fax from [facility] dated 02/10/2025. It states the [facility] does not review Mounjaro for this patient's plan. I will fax this to the PA Team at [phone]. I will also have it scanned urgently to patient's chart.

## 2025-02-10 NOTE — TELEPHONE ENCOUNTER
Received a fax from [facility] dated 02/10/2025. It states the [facility] does not review Mounjaro for this patient's plan. I will fax this to the PA Team at [phone]. I will also have it scanned urgently to patient's chart.

## 2025-02-10 NOTE — TELEPHONE ENCOUNTER
Received a fax from [facility] dated 02/10/2025. It states the [facility] does not review Mounjaro for this patient's plan. I will fax this to the PA Team at [phone]. I will also have it scanned urgently to patient's chart.

## 2025-02-11 NOTE — TELEPHONE ENCOUNTER
PA Initiation    Medication: MOUNJARO 7.5 MG/0.5ML SC SOAJ  Insurance Company: Comment:  PROMPT PA  Pharmacy Filling the Rx: [facility][id number] [place] [address] [facility] AT [facility] & [address]  Filling Pharmacy Phone: [phone]  Filling Pharmacy Fax:    Start Date: 2/10/2025  Retail Pharmacy Prior Authorization Team   Phone: [phone]

## 2025-02-11 NOTE — TELEPHONE ENCOUNTER
PA Initiation    Medication: MOUNJARO 5 MG/0.5ML SC SOAJ  Insurance Company: Comment:  PROMPT PA  Pharmacy Filling the Rx: [facility][id number] [place] [address] [facility] AT [facility] & [address]  Filling Pharmacy Phone: [phone]  Filling Pharmacy Fax:    Start Date: 2/10/2025  Retail Pharmacy Prior Authorization Team   Phone: [phone]

## 2025-02-11 NOTE — TELEPHONE ENCOUNTER
PA Initiation    Medication: MOUNJARO 2.5 MG/0.5ML SC SOAJ  Insurance Company: Comment:  PROMPT PA  Pharmacy [facility]illing the Rx: [id number] [place] [address] [facility] AT [facility] & [address]  Filling Pharmacy Phone: [phone]  Filling Pharmacy Fax:    Start Date: 2/10/2025  Retail Pharmacy Prior Authorization Team   Phone: [phone]

## 2025-02-12 NOTE — TELEPHONE ENCOUNTER
Prior Authorization Approval    Medication: MOUNJARO 2.5 MG/0.5ML SC SOAJ  Authorization Effective Date: 2/11/2025  Authorization Expiration Date: 2/10/2026  Approved Dose/Quantity:   Reference #:     Insurance Company: Comment:  [employer]  Expected CoPay: $    CoPay Card Available:      Financial Assistance Needed: No  Which Pharmacy is filling the prescription: [id number] [address]  AT [facility] & [facility]  Pharmacy Notified: Yes  Patient Notified: The pharmacy will notify the patient.

## 2025-02-12 NOTE — TELEPHONE ENCOUNTER
Prior Authorization Approval  Per Rep at RX Benefits this was approved for all three strengths    Medication: MOUNJARO 7.5 MG/0.5ML SC SOAJ  Authorization Effective Date: 2/11/2025  Authorization Expiration Date: 2/10/2026  Approved Dose/Quantity:   Reference #:     Insurance Company: Comment:  DK BEAVERS  Expected CoPay: $    CoPay Card Available:      Financial Assistance Needed: No  Which Pharmacy is filling the prescription: [facility] #[id number] [place] [address] [facility] [address] AT [facility] [address] & [facility]  Pharmacy Notified: Yes  Patient Notified: The pharmacy will notify the patient.

## 2025-02-12 NOTE — TELEPHONE ENCOUNTER
Prior Authorization Approval Per rep at RX Benefits this was approved for all 3 strengths    Medication: MOUNJARO 5 MG/0.5ML SC SOAJ  Authorization Effective Date: 2/11/2025  Authorization Expiration Date: 2/10/2026  Approved Dose/Quantity:   Reference #:     Insurance Company: Comment:  DK BEAVERS  Expected CoPay: $    CoPay Card Available:      Financial Assistance Needed: No  Which Pharmacy is filling the prescription: [facility] #[id number] - [place] [address] [facility] [address] AT [facility] [address] & [address]   Pharmacy Notified: Yes  Patient Notified: The pharmacy will notify the patient.

## 2025-02-15 ENCOUNTER — HEALTH MAINTENANCE LETTER (OUTPATIENT)
Age: 55
End: 2025-02-15

## 2025-02-25 ENCOUNTER — TELEPHONE (OUTPATIENT)
Dept: FAMILY MEDICINE | Facility: CLINIC | Age: 55
End: 2025-02-25
Payer: COMMERCIAL

## 2025-02-25 NOTE — TELEPHONE ENCOUNTER
General Call    Contacts       Contact Date/Time Type Contact Phone/Fax    02/25/2025 05:01 PM CST Phone (Incoming) Subha Howell (Self) [phone] (M)        Reason for Call:  Prior Auth Status    What are your questions or concerns:  Status of Mounjaro (Writer checked and it is closed, not sure why?)    Date of last appointment with provider: 1/31/25    Could we send this information to you in [place] or would you prefer to receive a phone call?:   Patient would prefer a phone call   Okay to leave a detailed message?: Yes at Home number on file [phone] (home)

## 2025-02-26 NOTE — TELEPHONE ENCOUNTER
I called and left voicemail if/when patient calls back, please inform her that the medication was approved by her insurance on 02/11/2025 until 02/10/2026. Patient's pharmacy should have informed her of this, but she is welcomed to call [facility].

## 2025-03-01 DIAGNOSIS — R06.2 WHEEZING: ICD-10-CM

## 2025-03-03 RX ORDER — ALBUTEROL SULFATE 90 UG/1
INHALANT RESPIRATORY (INHALATION)
Qty: 6.7 G | Refills: 2 | Status: SHIPPED | OUTPATIENT
Start: 2025-03-03

## 2025-05-05 DIAGNOSIS — E11.9 TYPE 2 DIABETES MELLITUS WITHOUT COMPLICATION, WITHOUT LONG-TERM CURRENT USE OF INSULIN (H): ICD-10-CM

## 2025-05-06 RX ORDER — TIRZEPATIDE 7.5 MG/.5ML
INJECTION, SOLUTION SUBCUTANEOUS
Qty: 2 ML | Refills: 0 | OUTPATIENT
Start: 2025-05-06

## 2025-05-06 NOTE — TELEPHONE ENCOUNTER
Clinic RN: Please investigate patient's chart or contact patient if the information cannot be found because  patient currently has 3 mounjaro doses on file. Please clarify with her where she is at in dosing.

## 2025-05-06 NOTE — TELEPHONE ENCOUNTER
Call with pt, who confirmed she has taken mounjaro 7.5mg for 4 doses. She is having diarrhea or constipation after taking the injection, then she feels fine. Current weight 265lbs. Pt would like to increase dose of mounjaro.   Mounjaro 10mg pended for approval if appropriate.

## 2025-05-18 ENCOUNTER — HEALTH MAINTENANCE LETTER (OUTPATIENT)
Age: 55
End: 2025-05-18

## 2025-06-09 ENCOUNTER — TELEPHONE (OUTPATIENT)
Dept: FAMILY MEDICINE | Facility: CLINIC | Age: 55
End: 2025-06-09
Payer: COMMERCIAL

## 2025-06-09 DIAGNOSIS — E11.9 TYPE 2 DIABETES MELLITUS WITHOUT COMPLICATION, WITHOUT LONG-TERM CURRENT USE OF INSULIN (H): ICD-10-CM

## 2025-06-09 DIAGNOSIS — E28.2 PCOS (POLYCYSTIC OVARIAN SYNDROME): ICD-10-CM

## 2025-06-09 NOTE — TELEPHONE ENCOUNTER
Medication Question or Refill        What medication are you calling about (include dose and sig)?: Mounjaro (pt wants to know if provider wants to keep her at the 10 mg or increase) and Escitalopram 10 mh    Preferred Pharmacy:   [facility] #[id number] - [place], [place] - [address] [facility]  [address] [address]  Phone: [phone] Fax: [phone]      Controlled Substance Agreement on file:   CSA -- Patient Level:    CSA: None found at the patient level.       Who prescribed the medication?: Dorothea Ellsworth    Do you need a refill? Yes    When did you use the medication last? Both today     Patient offered an appointment? No    Do you have any questions or concerns?  No      Could we send this information to you in [facility] or would you prefer to receive a phone call?:   No preference   Okay to leave a detailed message?: Yes at Cell number on file:    Telephone Information:   Mobile [phone]     Lary Cheung

## 2025-06-11 NOTE — TELEPHONE ENCOUNTER
Informed pt Dorothea Blum will discuss her medications with her tomorrow at her appt. She states understanding.

## 2025-06-11 NOTE — TELEPHONE ENCOUNTER
Incoming call from patient inquiring on status of refill request - Patient is scheduled 06/12/2025 with PCP

## 2025-06-11 NOTE — TELEPHONE ENCOUNTER
She is also due for lab work, she could do that ahead of time or when I see her at her appointment.I signed orders for the labs.

## 2025-06-12 ENCOUNTER — VIRTUAL VISIT (OUTPATIENT)
Dept: FAMILY MEDICINE | Facility: CLINIC | Age: 55
End: 2025-06-12
Payer: COMMERCIAL

## 2025-06-12 DIAGNOSIS — F32.A ANXIETY AND DEPRESSION: ICD-10-CM

## 2025-06-12 DIAGNOSIS — E66.01 SEVERE OBESITY (BMI >= 40) (H): ICD-10-CM

## 2025-06-12 DIAGNOSIS — F41.9 ANXIETY AND DEPRESSION: ICD-10-CM

## 2025-06-12 DIAGNOSIS — E28.2 PCOS (POLYCYSTIC OVARIAN SYNDROME): ICD-10-CM

## 2025-06-12 DIAGNOSIS — E11.9 TYPE 2 DIABETES MELLITUS WITHOUT COMPLICATION, WITHOUT LONG-TERM CURRENT USE OF INSULIN (H): Primary | ICD-10-CM

## 2025-06-12 RX ORDER — ONDANSETRON 4 MG/1
4 TABLET, ORALLY DISINTEGRATING ORAL EVERY 8 HOURS PRN
Qty: 20 TABLET | Refills: 0 | Status: SHIPPED | OUTPATIENT
Start: 2025-06-12

## 2025-06-12 RX ORDER — ESCITALOPRAM OXALATE 10 MG/1
10 TABLET ORAL DAILY
Qty: 90 TABLET | Refills: 3 | Status: SHIPPED | OUTPATIENT
Start: 2025-06-12

## 2025-06-12 NOTE — PROGRESS NOTES
Subha is a 55 year old who is being evaluated via a billable video visit.    How would you like to obtain your AVS? MyChart  If the video visit is dropped, the invitation should be resent by: Text to cell phone: [phone]  Will anyone else be joining your video visit? No      Assessment & Plan     (E11.9) Type 2 diabetes mellitus without complication, without long-term current use of insulin (H)  (primary encounter diagnosis)  Comment:   Plan: tirzepatide (MOUNJARO) 12.5 MG/0.5ML SOAJ         auto-injector pen, tirzepatide (MOUNJARO) 15         MG/0.5ML SOAJ auto-injector pen, ondansetron         (ZOFRAN ODT) 4 MG ODT tab            (F41.9,  F32.A) Anxiety and depression  Comment:   Plan: escitalopram (LEXAPRO) 10 MG tablet            (E66.01) Severe obesity (BMI >= 40) (H)  Comment:   Plan: tirzepatide (MOUNJARO) 12.5 MG/0.5ML SOAJ         auto-injector pen, tirzepatide (MOUNJARO) 15         MG/0.5ML SOAJ auto-injector pen, ondansetron         (ZOFRAN ODT) 4 MG ODT tab            Dx with DM in January, choosing only to use GLP1 (didn't tolerate metformin at the time.) Has plataued at 10 mg dose, will increase as side effects have resolved  Report wt loss of about #25, most of that the first month  Will schedule labs and see me in about 2 months in clinic for recheck      The longitudinal plan of care for the diagnosis(es)/condition(s) as documented were addressed during this visit. Due to the added complexity in care, I will continue to support Subha in the subsequent management and with ongoing continuity of care.            Subjective   Subha is a 55 year old, presenting for the following health issues:      Recheck Medication (Refills - patient states questions about dosage.)      6/12/2025    10:23 AM   Additional Questions   Roomed by Jessica VALDEZ CMA   Accompanied by None     Dx DM in Jan 2025  Started Mounjaro, just finished 4th month, on the 10mg dose.  Lost #20 the first month with many side effects  Has  lost #6 in the last 4 months  Is making effort to stop eating after 7 pm  She did quit the metformin, side effects were not tolerable    Saw Dr Patterson Gyn, has large fibroid, had some BTB and had a biopsy two days ago  No longer getting side effects                  Objective    Vitals - Patient Reported  Weight (Patient Reported): 117.5 kg (259 lb)        Physical Exam   GENERAL: alert and no distress  EYES: Eyes grossly normal to inspection.  No discharge or erythema, or obvious scleral/conjunctival abnormalities.  RESP: No audible wheeze, cough, or visible cyanosis.    SKIN: Visible skin clear. No significant rash, abnormal pigmentation or lesions.  NEURO: Cranial nerves grossly intact.  Mentation and speech appropriate for age.  PSYCH: Appropriate affect, tone, and pace of words          Video-Visit Details    Type of service:  Video Visit   Originating Location (pt. Location): Home    Distant Location (provider location):  On-site  Platform used for Video Visit: Gay  Signed Electronically by: Dorothea Ellsworth NP

## 2025-07-11 PROCEDURE — 80048 BASIC METABOLIC PNL TOTAL CA: CPT | Performed by: NURSE PRACTITIONER

## 2025-07-11 PROCEDURE — 82570 ASSAY OF URINE CREATININE: CPT | Performed by: NURSE PRACTITIONER

## 2025-07-16 ENCOUNTER — E-VISIT (OUTPATIENT)
Dept: FAMILY MEDICINE | Facility: CLINIC | Age: 55
End: 2025-07-16
Payer: COMMERCIAL

## 2025-07-16 DIAGNOSIS — E88.819 INSULIN RESISTANCE: ICD-10-CM

## 2025-07-17 NOTE — TELEPHONE ENCOUNTER
Plan of care reviewed with patient who verbalized understanding. AAOx4. VSS on 4L OF O2 per NC. NPO diet maintained. PRN Zofran and one time does of Phenergan given for nausea. G tube to gravity. ML w/ [place]. Pt has purewick in place with concentrated urine; adequate urine output. Turned Q2. Q4 accuchecks. Call light within reach. Bed in the lowest position. Patient mostly slept in between care. No acute events this shift. WCTM.        Problem: Skin and Tissue Injury (Artificial Airway)  Goal: Absence of Device-Related Skin or Tissue Injury  Outcome: Ongoing, Progressing      Problem: Nutrition Impaired (Sepsis/Septic Shock)  Goal: Optimal Nutrition Intake  Outcome: Ongoing, Progressing   Provider E-Visit time total (minutes): 5 minutes